# Patient Record
Sex: MALE | Race: WHITE | NOT HISPANIC OR LATINO | Employment: OTHER
[De-identification: names, ages, dates, MRNs, and addresses within clinical notes are randomized per-mention and may not be internally consistent; named-entity substitution may affect disease eponyms.]

---

## 2023-10-27 ENCOUNTER — TRANSCRIBE ORDERS (OUTPATIENT)
Dept: OTHER | Age: 78
End: 2023-10-27

## 2023-10-27 ENCOUNTER — PRE VISIT (OUTPATIENT)
Dept: ONCOLOGY | Facility: CLINIC | Age: 78
End: 2023-10-27

## 2023-10-27 ENCOUNTER — PATIENT OUTREACH (OUTPATIENT)
Dept: ONCOLOGY | Facility: CLINIC | Age: 78
End: 2023-10-27

## 2023-10-27 DIAGNOSIS — D49.6 BRAIN TUMOR (H): Primary | ICD-10-CM

## 2023-10-27 NOTE — TELEPHONE ENCOUNTER
RECORDS STATUS - ALL OTHER DIAGNOSIS      RECORDS RECEIVED FROM: [facility], [facility] (in [place]), [facility]    Appt Date: TBD NN WQ    Brain Tumor - Pt's Ph #: [phone]   Action    Action Taken 10/27/2023 2:43pm KATHERINE   I'll wait for RN Lary to complete her notes in EPIC.     10/30/2023 8:48AM KATHERINE   I called [facility] Help Desk Phone: [phone] - their office is closed right now.     9:45AM KATHERINE   I called pt Jac - his primary phone number is not in service.     I sent an ib-message to scheduling to see if they have a good phone # for the patient.     10/30/2023 3:29pm KATHERINE  I called [facility] in [place] again  [phone] #2. I was on hold for a few mins. [place] ID #: [id number]- I was able to successfully pull recs into the pt's [place] chart!     I called [facility]'s IMG dept- I was on hold for 10mins... I called back again... #2 #1- they don't print scans on a DICOM disc or email scans. They only push scans through [employer]. Once I fax over a formal request, they will email me a link to the [facility] website.     I faxed over a STAT request for scans.     10/30/2023 4pm KATHERINE   I called pt Jac again- he said his records at [facility] are 17-20 years ago- they are unrelated records. All of his related outside records are at [facility].     10/31/2023 9:27AM KATHERINE   I faxed another request to [facility] stating that we are okay receiving scans via [employer]- they will provide a link. I gave them my work email address: [email or web address]    11/1/2023 10:42AM KATHERINE   [facility] sent me [employer] e-mail links around 7:30pm last night. I'm not sure what the password is yet. I reached out to their email contact. I passed the emails along to the CSS team. Johnny Nathan has now been scheduled with Dr. Cannon (May 2024).       NOTES STATUS DETAILS   OFFICE NOTE from referring provider  Ref by:  self-referred    OFFICE NOTE from medical oncologist Complete [facility]- [facility] Records    DISCHARGE SUMMARY from hospital     DISCHARGE REPORT from the ER     OPERATIVE REPORT     CLINICAL TRIAL TREATMENTS TO DATE     LABS     PATHOLOGY REPORTS     ANYTHING RELATED TO DIAGNOSIS Complete Labs last updated on 3/17/2023 in [place]   GENONOMIC TESTING     TYPE:     IMAGING (NEED IMAGES & REPORT)     Xray Chest Requested- [facility] 10/4/2023, 1/5/2023   MRI Requested- [facility]     Ph: [phone]  Fax: [phone] MRI Brain 8/2/2023, 11/2/2022, 5/2/2022, 1/3/2022, 9/29/2021, 6/23/2021, 5/11/2021, 4/7/2021    MRA Head/Brain 5/11/2021    MRI Lumbar 8/2/2023    MAMMO     ULTRASOUND     PET

## 2023-10-27 NOTE — PROGRESS NOTES
"New Patient Hematology / Oncology Nurse Navigator Note     Referral Date: 10/27/23    Referring provider: Self-referred transfer of care (per email from Dr. Cueto to Dr. Cannon)    Referring Clinic/Organization: Self Referred     Referred to: Neuro Oncology    Requested provider (if applicable): Dr. Cannon    Evaluation for : H/o brain tumor (repeat imaging due 5/2024)      Clinical History (per Nurse review of records provided):     8/2/23 Office Visit with Neuro Oncology at [facility]:   \"Assessment & Plan:  Mr. Owens is a 77 year old right handed  with new onset diplopia on 4/4/21 with MRI brain showing an abnormal FLAIR/T2 signal in cerebellar vermis with a small enhancing lesion in the left midbrain. Staging workup shows negative CT chest abdomen pelvis and MRI spine shows a small enhancing lesion on left outgoing sacral nerve root. Diplopia improving without steroids or interventions.  Brain lesion with diplopia. MRI brain from 8/2/23 was overall stable compared to brain MRI from 11/2/22. FLAIR/T2 hyperintense lesion in the vermis now measures 6.5 mm, previously 5 mm in Sept 2021. Stable, per RANO criteria. No new contrast enhancement noted   We reviewed differential diagnoses including a possible low grade tumor in the vermis such as ependymoma or glioneuronal tumor, glioma and we may be seeing idiopathic cranial neuropathy as his diplopia has improved significantly with no steroids or interventions in the setting of an underlying lower grade malignancy. Other etiologies could include possible post surgical stroke, post inflammatory changes or inflammatory disorders such as neurosarcoidois, CNS lymphoma.   Thus far, he has no symptoms concerning for possible malignancy. He has had stable brain MRIs without mass effect or other characteristics noted that would predict aggressive behavior to cerebellar lesion. We can now increase MRI interval to q9 months.   We will see him back in 9 months with MRI " "of the brain.  Continue L-spine imaging yearly.  We would not recommend further workup at this time unless he has new or worsening symptoms. We also discussed potential LP for CSF studies including cytology and flow. Hold off for now \"-- BOOKMARKED  8/2/23 Brain MRI/ MRI Lumbar Spine-- BOOKMARKED      Clinical Assessment / Barriers to Care (Per Nurse):  Pt lives in [place]. 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     Records Location: Care Everywhere     Records Needed:   Records from:   [facility], [facility] (in [place]), [facility], [facility] per protocol (need to request brain path, unsure where brain surgery was done) please reach out to patient to obtain all records relating to his brain tumor.     Additional testing needed prior to consult:   Brain MRI due May 2024 (L-spine MRI due Aug 2024 ). Patient is not currently having any new neurological symptoms so would like to have imaging done in May and follow-up with Dr. Cannon at that time if she is agreeable with that plan.  He is agreeable to waiting on L-Spine imaging until August as his last scan was stable and recommendation is to repeat L-spine imaging in 1 year.     IB to Dr. Cannon to review and order imaging if appropriate.     Referral updates and Plan:   OUTGOING CALL to pt:  Introduced my role as nurse navigator with [facility] Hematology/Oncology dept and that we have recd the referral to Dr. Cannon (Neuro Oncology) for transfer of care from [place]. Provided my direct contact information if future questions arise.     Will await input from Dr. Cannon and arrange imaging/follow-up accordingly.     Addendum: Pt scheduled for brain MRI/Dr. Cannon May 2024, will wait until August for L-spine imaging    Lary Neri, BSN, RN, PHN, OCN  Hematology/Oncology Nurse Navigator  [facility]  [phone]    "

## 2023-10-30 DIAGNOSIS — R90.89 MRI OF BRAIN ABNORMAL: ICD-10-CM

## 2023-10-30 DIAGNOSIS — G93.9 BRAIN LESION: Primary | ICD-10-CM

## 2023-10-31 DIAGNOSIS — R93.7 ABNORMAL MRI, LUMBAR SPINE: Primary | ICD-10-CM

## 2023-11-06 ENCOUNTER — TELEPHONE (OUTPATIENT)
Dept: INTERNAL MEDICINE | Facility: CLINIC | Age: 78
End: 2023-11-06
Payer: MEDICARE

## 2023-11-06 NOTE — TELEPHONE ENCOUNTER
[facility] Call Center    Phone Message    May a detailed message be left on voicemail: yes     Reason for Call: Other: Patient wanted to schedule an appointment with Mary as a new patient and stated Mary Wright spoke to patients doctor Dr. Brayan Cueto and agreed to take on Jac as a new patient. Please verify and call patient to discuss.

## 2023-11-18 ENCOUNTER — HEALTH MAINTENANCE LETTER (OUTPATIENT)
Age: 78
End: 2023-11-18

## 2023-12-27 ENCOUNTER — LAB (OUTPATIENT)
Dept: LAB | Facility: CLINIC | Age: 78
End: 2023-12-27
Payer: MEDICARE

## 2023-12-27 ENCOUNTER — OFFICE VISIT (OUTPATIENT)
Dept: INTERNAL MEDICINE | Facility: CLINIC | Age: 78
End: 2023-12-27
Payer: MEDICARE

## 2023-12-27 VITALS
SYSTOLIC BLOOD PRESSURE: 113 MMHG | DIASTOLIC BLOOD PRESSURE: 79 MMHG | HEART RATE: 71 BPM | WEIGHT: 154.1 LBS | OXYGEN SATURATION: 95 % | BODY MASS INDEX: 22.82 KG/M2 | HEIGHT: 69 IN

## 2023-12-27 DIAGNOSIS — Z23 NEED FOR TDAP VACCINATION: ICD-10-CM

## 2023-12-27 DIAGNOSIS — Z00.00 ROUTINE GENERAL MEDICAL EXAMINATION AT A HEALTH CARE FACILITY: ICD-10-CM

## 2023-12-27 DIAGNOSIS — Z12.5 ENCOUNTER FOR SCREENING FOR MALIGNANT NEOPLASM OF PROSTATE: ICD-10-CM

## 2023-12-27 DIAGNOSIS — Z29.11 NEED FOR VACCINATION AGAINST RESPIRATORY SYNCYTIAL VIRUS: ICD-10-CM

## 2023-12-27 DIAGNOSIS — E11.9 TYPE 2 DIABETES MELLITUS WITHOUT COMPLICATION, WITHOUT LONG-TERM CURRENT USE OF INSULIN (H): ICD-10-CM

## 2023-12-27 DIAGNOSIS — R05.9 COUGH, UNSPECIFIED TYPE: ICD-10-CM

## 2023-12-27 DIAGNOSIS — N40.0 BENIGN PROSTATIC HYPERPLASIA WITHOUT LOWER URINARY TRACT SYMPTOMS: ICD-10-CM

## 2023-12-27 DIAGNOSIS — C44.321 SQUAMOUS CELL CANCER OF SKIN OF ALA NASI: ICD-10-CM

## 2023-12-27 DIAGNOSIS — M10.9 GOUT, UNSPECIFIED CAUSE, UNSPECIFIED CHRONICITY, UNSPECIFIED SITE: ICD-10-CM

## 2023-12-27 DIAGNOSIS — Z11.59 NEED FOR HEPATITIS C SCREENING TEST: Primary | ICD-10-CM

## 2023-12-27 DIAGNOSIS — E78.00 HYPERCHOLESTEREMIA: ICD-10-CM

## 2023-12-27 DIAGNOSIS — I10 BENIGN ESSENTIAL HYPERTENSION: ICD-10-CM

## 2023-12-27 LAB
BASOPHILS # BLD AUTO: 0.1 10E3/UL (ref 0–0.2)
BASOPHILS NFR BLD AUTO: 1 %
CHOLEST SERPL-MCNC: 142 MG/DL
EOSINOPHIL # BLD AUTO: 0.4 10E3/UL (ref 0–0.7)
EOSINOPHIL NFR BLD AUTO: 3 %
ERYTHROCYTE [DISTWIDTH] IN BLOOD BY AUTOMATED COUNT: 14.6 % (ref 10–15)
FASTING STATUS PATIENT QL REPORTED: YES
HBA1C MFR BLD: 6.2 %
HCT VFR BLD AUTO: 51.1 % (ref 40–53)
HDLC SERPL-MCNC: 42 MG/DL
HGB BLD-MCNC: 16.9 G/DL (ref 13.3–17.7)
IMM GRANULOCYTES # BLD: 0.2 10E3/UL
IMM GRANULOCYTES NFR BLD: 2 %
LDLC SERPL CALC-MCNC: 62 MG/DL
LYMPHOCYTES # BLD AUTO: 3.5 10E3/UL (ref 0.8–5.3)
LYMPHOCYTES NFR BLD AUTO: 30 %
MCH RBC QN AUTO: 32.3 PG (ref 26.5–33)
MCHC RBC AUTO-ENTMCNC: 33.1 G/DL (ref 31.5–36.5)
MCV RBC AUTO: 98 FL (ref 78–100)
MONOCYTES # BLD AUTO: 1 10E3/UL (ref 0–1.3)
MONOCYTES NFR BLD AUTO: 9 %
NEUTROPHILS # BLD AUTO: 6.2 10E3/UL (ref 1.6–8.3)
NEUTROPHILS NFR BLD AUTO: 55 %
NONHDLC SERPL-MCNC: 100 MG/DL
NRBC # BLD AUTO: 0 10E3/UL
NRBC BLD AUTO-RTO: 0 /100
PLATELET # BLD AUTO: 259 10E3/UL (ref 150–450)
PSA SERPL DL<=0.01 NG/ML-MCNC: 12.72 NG/ML (ref 0–6.5)
RBC # BLD AUTO: 5.23 10E6/UL (ref 4.4–5.9)
TRIGL SERPL-MCNC: 188 MG/DL
TSH SERPL DL<=0.005 MIU/L-ACNC: 1.93 UIU/ML (ref 0.3–4.2)
URATE SERPL-MCNC: 3.5 MG/DL (ref 3.4–7)
WBC # BLD AUTO: 11.1 10E3/UL (ref 4–11)

## 2023-12-27 PROCEDURE — 36415 COLL VENOUS BLD VENIPUNCTURE: CPT | Performed by: PATHOLOGY

## 2023-12-27 PROCEDURE — 99204 OFFICE O/P NEW MOD 45 MIN: CPT | Performed by: INTERNAL MEDICINE

## 2023-12-27 PROCEDURE — 84443 ASSAY THYROID STIM HORMONE: CPT | Performed by: PATHOLOGY

## 2023-12-27 PROCEDURE — 85025 COMPLETE CBC W/AUTO DIFF WBC: CPT | Performed by: PATHOLOGY

## 2023-12-27 PROCEDURE — G0103 PSA SCREENING: HCPCS | Performed by: PATHOLOGY

## 2023-12-27 PROCEDURE — 80061 LIPID PANEL: CPT | Performed by: PATHOLOGY

## 2023-12-27 PROCEDURE — 99000 SPECIMEN HANDLING OFFICE-LAB: CPT | Performed by: PATHOLOGY

## 2023-12-27 PROCEDURE — 84550 ASSAY OF BLOOD/URIC ACID: CPT | Performed by: PATHOLOGY

## 2023-12-27 PROCEDURE — 83036 HEMOGLOBIN GLYCOSYLATED A1C: CPT | Performed by: INTERNAL MEDICINE

## 2023-12-27 RX ORDER — CALCIUM CARBONATE 500 MG/1
1 TABLET, CHEWABLE ORAL PRN
COMMUNITY

## 2023-12-27 RX ORDER — ALLOPURINOL 300 MG/1
1 TABLET ORAL DAILY
COMMUNITY
Start: 2023-07-17 | End: 2024-06-28

## 2023-12-27 RX ORDER — SIMVASTATIN 40 MG
40 TABLET ORAL DAILY
COMMUNITY
Start: 2023-07-17 | End: 2024-09-24

## 2023-12-27 RX ORDER — FLUTICASONE PROPIONATE 50 MCG
2 SPRAY, SUSPENSION (ML) NASAL DAILY
COMMUNITY
Start: 2023-07-17

## 2023-12-27 RX ORDER — TRIAMCINOLONE ACETONIDE 1 MG/G
OINTMENT TOPICAL PRN
COMMUNITY

## 2023-12-27 RX ORDER — FAMOTIDINE 40 MG/1
40 TABLET, FILM COATED ORAL 2 TIMES DAILY
COMMUNITY
End: 2024-06-28

## 2023-12-27 RX ORDER — LORATADINE 10 MG/1
10 TABLET ORAL DAILY
COMMUNITY

## 2023-12-27 RX ORDER — PANTOPRAZOLE SODIUM 40 MG/1
40 TABLET, DELAYED RELEASE ORAL DAILY
COMMUNITY
End: 2024-07-01

## 2023-12-27 RX ORDER — LISINOPRIL 20 MG/1
20 TABLET ORAL DAILY
COMMUNITY
Start: 2023-07-17 | End: 2024-06-27

## 2023-12-27 RX ORDER — RESPIRATORY SYNCYTIAL VIRUS VACCINE 120MCG/0.5
0.5 KIT INTRAMUSCULAR ONCE
Qty: 1 EACH | Refills: 0 | Status: CANCELLED | OUTPATIENT
Start: 2023-12-27 | End: 2023-12-27

## 2023-12-27 RX ORDER — IPRATROPIUM BROMIDE AND ALBUTEROL 20; 100 UG/1; UG/1
1 SPRAY, METERED RESPIRATORY (INHALATION) PRN
COMMUNITY

## 2023-12-27 RX ORDER — CYCLOBENZAPRINE HCL 5 MG
5 TABLET ORAL PRN
COMMUNITY

## 2023-12-27 RX ORDER — METFORMIN HCL 500 MG
1 TABLET, EXTENDED RELEASE 24 HR ORAL DAILY
COMMUNITY
Start: 2023-07-17

## 2023-12-27 NOTE — PROGRESS NOTES
"Jac Owens is a pleasant 78 year old year old male coming in today to establish care.     The patient's wife established care last week, Jac is here to establish and consolidate care today as well.     He had a slight cough last month. He reports every with URI he experience a bronchitis type exacerbation and dyspnea with exertion. Jac states he has returned to baseline and in general responds well to antibiotics and steroids.    He had a borderline PSA (although it is trending down over the last two years) and has a history of BPH. Urology referral.     Davide has a PMH of SCC with successful removal. Dermatology referral.    Patient reports occasional reflux and rare dysphasia. He states he is generally asymptomatic, last surgery was 3 years ago, Nissen. He had a coloscopy and ultrasound with benign polyps seen in both his stomach and colon. GI appointment is pending.     He states the majority of the time is spent taking care of his wife who was diagnosed with endometrial cancer. He experienced stress with the move back to [place] and believes his recent shingles infection was due to the associated stress. Jac usually walks on his treadmill and participates in weight resistance training. He and his wife are enjoying reconnecting with decades old friends since moving back.     His hypertension and diabetes appear well controlled with current medication.     Davide reports a history of slight MCV elevation. Continue regular monitoring.     Fasting routine labs ordered today. Patient will receive the most up to date COVID-19 and influenza series, as well as all other necessary vaccinations at their local pharmacy. They have a routine eye care appointment pending. They are up to date on all other health care maintenance.    /79 (BP Location: Right arm, Patient Position: Sitting, Cuff Size: Adult Regular)   Pulse 71   Ht 1.753 m (5' 9\")   Wt 69.9 kg (154 lb 1.6 oz)   SpO2 95%   BMI 22.76 " kg/m      PHYSICAL EXAM    Constitutional: no distress, comfortable, pleasant   Cardiovascular: regular rate and rhythm, normal S1 and S2, no murmurs, rubs or gallops, peripheral pulses full and symmetric   Respiratory: clear to auscultation, no wheezes or crackles, normal breath sounds  Skin: no concerning lesions, no jaundice   Neurological:  normal gait, no tremor   Psychological: appropriate mood   Lymphatic: no cervical lymphadenopathy and no pedal edema      ASSESSMENT & PLAN    Jac was seen today for establish care.    Benign essential hypertension: well controlled, continue current medication    Type 2 diabetes mellitus without complication, without long-term current use of insulin (H)  -     Hemoglobin A1c; Future  -     TSH with free T4 reflex; Future    Hypercholesteremia  -     Lipid Profile FASTING; Future    Benign prostatic hyperplasia without lower urinary tract symptoms  -     PSA, screen; Future  -     Adult Urology  Referral; Future    Cough, unspecified type  -     CBC with platelets and differential; Future    Routine general medical examination at a health care facility  -     REVIEW OF HEALTH MAINTENANCE PROTOCOL ORDERS    Gout, unspecified cause, unspecified chronicity, unspecified site  -     Uric acid; Future    Encounter for screening for malignant neoplasm of prostate  -     PSA, screen; Future    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 Mary Wright- -based on data collection and the provider's statements to me.     Provider Disclosure:  I agree with above History, Review of Systems, Physical exam and Plan.  I have reviewed the content of the documentation and have edited it as needed. I have personally performed the services documented here and the documentation  accurately represents those services and the decisions I have made.      Electronically signed by:  Dr. Mary Wright

## 2023-12-27 NOTE — PROGRESS NOTES
Jac is a 78 year old that presents in clinic today for the following:     Chief Complaint   Patient presents with    Establish Care     See list           12/27/2023    11:04 AM   Additional Questions   Roomed by [facility] EMT       Screenings as of today     PHQ-2 Total Score (Adult) - Positive if 3 or more points; Administer   PHQ-9 if positive 0        Sharona Mejia MA at 11:19 AM on 12/27/2023

## 2024-01-18 NOTE — TELEPHONE ENCOUNTER
Action 2024 JTV 9:20 PM    Action Taken CSS sent an urgent request to [facility].     Trackin    [facility], fax: [phone]     Action 2024 JTV 4:12 PM    Action Taken CSS had Glenda resolve images in [facility].    MEDICAL RECORDS REQUEST   [facility]  Urology Clinic  [address] [place] [address]  PHONE: [phone]  Fax: [phone]        FUTURE VISIT INFORMATION                                                   Jac ALBERT Roman, : 1945 scheduled for future visit at [facility]    APPOINTMENT INFORMATION:  Date: 2024  Provider:  Boubacar Lenz PA-C  Reason for Visit/Diagnosis: Benign prostatic hyperplasia without lower urinary tract symptoms    REFERRAL INFORMATION:  Referring provider:  Mary Kwan MD in [facility]      RECORDS REQUESTED FOR VISIT                                                     NOTES  STATUS/DETAILS   OFFICE NOTE from referring provider  yes, 2023 -- Mary Kwan MD in [facility]   OFFICE NOTE from other specialist  yes   MEDICATION LIST  yes   LABS     URINALYSIS (UA)  yes   BENIGN PROSTATIC HYPERPLASIA (BPH)     IMAGES  YES, 2022 -- CT ABD PELVIS   PSA  yes     PRE-VISIT CHECKLIST      Joint diagnostic appointment coordinated correctly          (ensure right order & amount of time) Yes   RECORD COLLECTION COMPLETE yes

## 2024-02-16 NOTE — PROGRESS NOTES
Subjective     REQUESTING PROVIDER  Mary Wright    REASON FOR VISIT  Urologic establishing visit    HISTORY OF PRESENT ILLNESS  Mr. Owens is a pleasant 78 year old male with a past medical history significant for insomnia, hypertension, type 2 diabetes, hypercholesterolemia, and gout who presents today to establish with a local urology department for his issues with BPH and elevated PSA.  He has previously followed for these with his urologist in [place], but recently moved to [place].  I personally reviewed his most recent family practice visit note from 12/27/2023 as well as the outside urology note from 3/6/2023 in preparation for today's visit.    At that urologic visit, he was presenting due to elevated PSA of 13.  It was reviewed that he has had a prostate biopsy all the way back in 2014 that was negative, and his prostate was sized to be about 41 g at that time.  From a urinary standpoint, he endorsed some slight increase in his urinary urgency as well as the need to double void, but felt his stream was strong.  Denied any hematuria, dysuria, fevers, chills, or abdominal discomfort or pressure.  Also endorsed a family history of prostate cancer as well as breast cancer.  IPSS was noted to be 13 placing him in the moderate category, but his quality-of-life score was 1 stating he would be fine to live this way for the rest of his life.    Recently moved here to [place] and was found to have an elevated PSA again of 12.72 so was referred to urology.    Today:  No pelvic pain  No gross hematuria or hematospermia   No change to urinary symptoms recently  No baseline bothersome urinary symptoms   No history of UTIs     Social History:  Denies any history of or current smoking     Family History:  Denies any known family history of urologic malignancy     Objective     PHYSICAL EXAM  General: Alert, oriented, no acute distress    LABORATORY  Lab Results   Component Value Date/Time    PSA  12.72 (H) 12/27/2023 11:51 AM     Outside PSA results:    5/8/23: 8.48  3/8/2023: 9.24  1/17/23: 13.72 (17% free)  PSA 9.461 (High) 08/03/2022   PSA 11.566 (High) 12/08/2021   PSA 6.053 (High) 04/26/2021 7/13/20; 6.02  10/10/19; 6.26  9/7/19; 5.91  4/17/19; 6.63  4/16/18; 4.74  3/1/17: 6.42  1/20/16: 6.63  7/18/14: 7.12  10/29/13: 9.30     IMAGING  No history of prostate MRI    Assessment & Plan   Elevated PSA  BPH with no bothersome symptoms     It was my pleasure meeting Mr. Owens in clinic today for discussion of his long standing history of elevated PSA. We first reviewed the etiologies of elevated PSA, including infection, inflammation, ejaculation prior to sampling, BPH, recent perineal trauma or catheterization, versus malignancy. We then discussed the natural history of prostate cancer and how it shapes our prostate cancer screening. Finally, we reviewed Mr. Owens current and previous PSA's and discussed that we only ever had 1 better piece of information from a PSA in the form of the biopsy that was done a decade ago.  In addition, had he seen us in his early to mid 70s, we likely would have recommended discontinuation of prostate cancer screening given his negative biopsy and reassuring PSAs.  However, given that we have now seen an increase in his baseline, this could represent the development of a prostate cancer versus just continued inflammation. In light of this, we discussed his options which include a prostate MRI first available to get better with some information versus discontinuation of prostate cancer screening.    After a detailed discussion during which we reviewed the risks and benefits of the above options, Mr. Owens elects to proceed with a first available prostate MRI. Mr. Owens expressed understanding and agreement to the above discussion and plan and all of his questions were answered to his satisfaction. [facility] and recommended next  steps.    PLAN  First available prostate MRI with [employer] message to discuss results and next steps    Signed by:    Boubacar Lenz PA-C    I spent a total of 21 minutes spent on the date of the encounter doing chart review, history and exam, documentation, and further activities as noted above.

## 2024-02-19 ENCOUNTER — TRANSFERRED RECORDS (OUTPATIENT)
Dept: HEALTH INFORMATION MANAGEMENT | Facility: CLINIC | Age: 79
End: 2024-02-19
Payer: MEDICARE

## 2024-02-21 ENCOUNTER — OFFICE VISIT (OUTPATIENT)
Dept: UROLOGY | Facility: CLINIC | Age: 79
End: 2024-02-21
Attending: STUDENT IN AN ORGANIZED HEALTH CARE EDUCATION/TRAINING PROGRAM
Payer: MEDICARE

## 2024-02-21 ENCOUNTER — PRE VISIT (OUTPATIENT)
Dept: UROLOGY | Facility: CLINIC | Age: 79
End: 2024-02-21

## 2024-02-21 ENCOUNTER — TELEPHONE (OUTPATIENT)
Dept: UROLOGY | Facility: CLINIC | Age: 79
End: 2024-02-21

## 2024-02-21 VITALS
BODY MASS INDEX: 22.22 KG/M2 | HEART RATE: 77 BPM | DIASTOLIC BLOOD PRESSURE: 78 MMHG | HEIGHT: 69 IN | SYSTOLIC BLOOD PRESSURE: 116 MMHG | WEIGHT: 150 LBS

## 2024-02-21 DIAGNOSIS — N40.0 BENIGN PROSTATIC HYPERPLASIA WITHOUT LOWER URINARY TRACT SYMPTOMS: ICD-10-CM

## 2024-02-21 DIAGNOSIS — R97.20 ELEVATED PSA: Primary | ICD-10-CM

## 2024-02-21 PROCEDURE — 99204 OFFICE O/P NEW MOD 45 MIN: CPT | Performed by: STUDENT IN AN ORGANIZED HEALTH CARE EDUCATION/TRAINING PROGRAM

## 2024-02-21 ASSESSMENT — PAIN SCALES - GENERAL: PAINLEVEL: NO PAIN (0)

## 2024-02-21 NOTE — NURSING NOTE
"Chief Complaint   Patient presents with    Consult For     Benign prostatic hyperplasia without lower urinary tract symptoms       Blood pressure 116/78, pulse 77, height 1.753 m (5' 9\"), weight 68 kg (150 lb). Body mass index is 22.15 kg/m .    There is no problem list on file for this patient.      Allergies   Allergen Reactions    Bee Venom Unknown    Cats     Seasonal Allergies        Current Outpatient Medications   Medication Sig Dispense Refill    allopurinol (ZYLOPRIM) 300 MG tablet Take 1 tablet by mouth daily      calcium carbonate (TUMS) 500 MG chewable tablet Take 1 chew tab by mouth as needed for heartburn      cyclobenzaprine (FLEXERIL) 5 MG tablet Take 5 mg by mouth as needed for muscle spasms      doxylamine (UNISOM) 12.5 mg TABS half-tab Take 12.5 mg by mouth at bedtime      famotidine (PEPCID) 40 MG tablet Take 40 mg by mouth 2 times daily      fluticasone (FLONASE) 50 MCG/ACT nasal spray 2 sprays daily      ipratropium-albuterol (COMBIVENT RESPIMAT)  MCG/ACT inhaler Inhale 1 puff into the lungs as needed for shortness of breath, wheezing or cough      lisinopril (ZESTRIL) 20 MG tablet Take 20 mg by mouth daily      loratadine (CLARITIN) 10 MG tablet Take 10 mg by mouth daily      metFORMIN (GLUCOPHAGE XR) 500 MG 24 hr tablet Take 1 tablet by mouth daily      pantoprazole (PROTONIX) 40 MG EC tablet Take 40 mg by mouth daily Takes 2 per day      simvastatin (ZOCOR) 40 MG tablet Take 40 mg by mouth daily      triamcinolone (KENALOG) 0.1 % external ointment Apply topically as needed for irritation         Social History     Tobacco Use    Smoking status: Never    Smokeless tobacco: Never   Substance Use Topics    Alcohol use: Never    Drug use: Never       Nathanael Tilley MA  2/21/2024  8:54 AM     "

## 2024-02-21 NOTE — LETTER
2/21/2024       RE: Jac Owens  [address] [address] [place] [address]     Dear Colleague,    Thank you for referring your patient, Jac Owens, to the [facility] [place] at [facility]. Please see a copy of my visit note below.    Subjective    REQUESTING PROVIDER  Mary Wright    REASON FOR VISIT  Urologic establishing visit    HISTORY OF PRESENT ILLNESS  Mr. Owens is a pleasant 78 year old male with a past medical history significant for insomnia, hypertension, type 2 diabetes, hypercholesterolemia, and gout who presents today to establish with a local urology department for his issues with BPH and elevated PSA.  He has previously followed for these with his urologist in [place], but recently moved to [place].  I personally reviewed his most recent family practice visit note from 12/27/2023 as well as the outside urology note from 3/6/2023 in preparation for today's visit.    At that urologic visit, he was presenting due to elevated PSA of 13.  It was reviewed that he has had a prostate biopsy all the way back in 2014 that was negative, and his prostate was sized to be about 41 g at that time.  From a urinary standpoint, he endorsed some slight increase in his urinary urgency as well as the need to double void, but felt his stream was strong.  Denied any hematuria, dysuria, fevers, chills, or abdominal discomfort or pressure.  Also endorsed a family history of prostate cancer as well as breast cancer.  IPSS was noted to be 13 placing him in the moderate category, but his quality-of-life score was 1 stating he would be fine to live this way for the rest of his life.    Recently moved here to [place] and was found to have an elevated PSA again of 12.72 so was referred to urology.    Today:  No pelvic pain  No gross hematuria or hematospermia   No change to urinary symptoms recently  No baseline  bothersome urinary symptoms   No history of UTIs     Social History:  Denies any history of or current smoking     Family History:  Denies any known family history of urologic malignancy     Objective    PHYSICAL EXAM  General: Alert, oriented, no acute distress    LABORATORY  Lab Results   Component Value Date/Time    PSA 12.72 (H) 12/27/2023 11:51 AM     Outside PSA results:    5/8/23: 8.48  3/8/2023: 9.24  1/17/23: 13.72 (17% free)  PSA 9.461 (High) 08/03/2022   PSA 11.566 (High) 12/08/2021   PSA 6.053 (High) 04/26/2021 7/13/20; 6.02  10/10/19; 6.26  9/7/19; 5.91  4/17/19; 6.63  4/16/18; 4.74  3/1/17: 6.42  1/20/16: 6.63  7/18/14: 7.12  10/29/13: 9.30     IMAGING  No history of prostate MRI    Assessment & Plan  Elevated PSA  BPH with no bothersome symptoms     It was my pleasure meeting Mr. Owens in clinic today for discussion of his long standing history of elevated PSA. We first reviewed the etiologies of elevated PSA, including infection, inflammation, ejaculation prior to sampling, BPH, recent perineal trauma or catheterization, versus malignancy. We then discussed the natural history of prostate cancer and how it shapes our prostate cancer screening. Finally, we reviewed Mr. Owens current and previous PSA's and discussed that we only ever had 1 better piece of information from a PSA in the form of the biopsy that was done a decade ago.  In addition, had he seen us in his early to mid 70s, we likely would have recommended discontinuation of prostate cancer screening given his negative biopsy and reassuring PSAs.  However, given that we have now seen an increase in his baseline, this could represent the development of a prostate cancer versus just continued inflammation. In light of this, we discussed his options which include a prostate MRI first available to get better with some information versus discontinuation of prostate cancer screening.    After a detailed discussion during which we reviewed  the risks and benefits of the above options, Mr. Owens elects to proceed with a first available prostate MRI. Mr. Owens expressed understanding and agreement to the above discussion and plan and all of his questions were answered to his satisfaction. I will reach out over [employer] with the results and recommended next steps.    PLAN  First available prostate MRI with [employer] message to discuss results and next steps    Signed by:    Boubacar Lenz PA-C    I spent a total of 21 minutes spent on the date of the encounter doing chart review, history and exam, documentation, and further activities as noted above.

## 2024-02-29 ENCOUNTER — OFFICE VISIT (OUTPATIENT)
Dept: PODIATRY | Facility: CLINIC | Age: 79
End: 2024-02-29
Attending: INTERNAL MEDICINE
Payer: MEDICARE

## 2024-02-29 ENCOUNTER — OFFICE VISIT (OUTPATIENT)
Dept: INTERNAL MEDICINE | Facility: CLINIC | Age: 79
End: 2024-02-29
Payer: MEDICARE

## 2024-02-29 VITALS
WEIGHT: 158.3 LBS | HEIGHT: 69 IN | OXYGEN SATURATION: 97 % | DIASTOLIC BLOOD PRESSURE: 77 MMHG | SYSTOLIC BLOOD PRESSURE: 136 MMHG | BODY MASS INDEX: 23.44 KG/M2 | HEART RATE: 80 BPM

## 2024-02-29 VITALS — SYSTOLIC BLOOD PRESSURE: 118 MMHG | BODY MASS INDEX: 23.33 KG/M2 | WEIGHT: 158 LBS | DIASTOLIC BLOOD PRESSURE: 78 MMHG

## 2024-02-29 DIAGNOSIS — R05.3 CHRONIC COUGH: Primary | ICD-10-CM

## 2024-02-29 DIAGNOSIS — L85.3 XEROSIS OF SKIN: ICD-10-CM

## 2024-02-29 DIAGNOSIS — L84 PRE-ULCERATIVE CALLUSES: ICD-10-CM

## 2024-02-29 DIAGNOSIS — M79.671 RIGHT FOOT PAIN: Primary | ICD-10-CM

## 2024-02-29 DIAGNOSIS — E11.9 TYPE 2 DIABETES MELLITUS WITHOUT COMPLICATION, WITHOUT LONG-TERM CURRENT USE OF INSULIN (H): ICD-10-CM

## 2024-02-29 PROCEDURE — 99203 OFFICE O/P NEW LOW 30 MIN: CPT | Performed by: PODIATRIST

## 2024-02-29 PROCEDURE — 99213 OFFICE O/P EST LOW 20 MIN: CPT

## 2024-02-29 NOTE — LETTER
2/29/2024         RE: Jac Owens  [address] [facility] [address] [place] [id number]        Dear Colleague,    Thank you for referring your patient, Jac Owens, to the [facility] POD[facility]. Please see a copy of my visit note below.    PATIENT HISTORY:  Dr. Italo Wright requested I see this patient for their foot issue.  Jac Owens is a 78 year old male who presents to clinic for left foot corn.  Has been there for 2 weeks.  Notes it is an aching pain.  Pain can be 7 out of 10 when walking.  He has tried scraping it which may help a little bit.  Denies specific injury.  Wondering what can be done to get rid of it.    Review of Systems:  Patient denies fever, chills, rash, wound, stiffness,  numbness, weakness, heart burn, blood in stool, chest pain with activity, calf pain when walking, shortness of breath with activity, chronic cough, easy bleeding/bruising, swelling of ankles, excessive thirst, fatigue, depression, anxiety.  Patient admits to limping at times. .     PAST MEDICAL HISTORY:   Past Medical History:   Diagnosis Date     Cancer (H)      COPD (chronic obstructive pulmonary disease) (H)      Hypertension      Uncomplicated asthma         PAST SURGICAL HISTORY:   Past Surgical History:   Procedure Laterality Date     ABDOMEN SURGERY      Paraesophageal hernia, JIST Tumor        MEDICATIONS:   Current Outpatient Medications:      allopurinol (ZYLOPRIM) 300 MG tablet, Take 1 tablet by mouth daily, Disp: , Rfl:      calcium carbonate (TUMS) 500 MG chewable tablet, Take 1 chew tab by mouth as needed for heartburn, Disp: , Rfl:      cyclobenzaprine (FLEXERIL) 5 MG tablet, Take 5 mg by mouth as needed for muscle spasms, Disp: , Rfl:      doxylamine (UNISOM) 12.5 mg TABS half-tab, Take 12.5 mg by mouth at bedtime, Disp: , Rfl:      famotidine (PEPCID) 40 MG tablet, Take 40 mg by mouth 2 times daily, Disp: , Rfl:      fluticasone (FLONASE) 50 MCG/ACT nasal spray, 2  sprays daily, Disp: , Rfl:      ipratropium-albuterol (COMBIVENT RESPIMAT)  MCG/ACT inhaler, Inhale 1 puff into the lungs as needed for shortness of breath, wheezing or cough, Disp: , Rfl:      lisinopril (ZESTRIL) 20 MG tablet, Take 20 mg by mouth daily, Disp: , Rfl:      loratadine (CLARITIN) 10 MG tablet, Take 10 mg by mouth daily, Disp: , Rfl:      metFORMIN (GLUCOPHAGE XR) 500 MG 24 hr tablet, Take 1 tablet by mouth daily, Disp: , Rfl:      pantoprazole (PROTONIX) 40 MG EC tablet, Take 40 mg by mouth daily Takes 2 per day, Disp: , Rfl:      simvastatin (ZOCOR) 40 MG tablet, Take 40 mg by mouth daily, Disp: , Rfl:      triamcinolone (KENALOG) 0.1 % external ointment, Apply topically as needed for irritation, Disp: , Rfl:      ALLERGIES:    Allergies   Allergen Reactions     Bee Venom Unknown     Cats      Seasonal Allergies         SOCIAL HISTORY:   Social History     Socioeconomic History     Marital status:      Spouse name: Not on file     Number of children: Not on file     Years of education: Not on file     Highest education level: Not on file   Occupational History     Not on file   Tobacco Use     Smoking status: Never     Smokeless tobacco: Never   Substance and Sexual Activity     Alcohol use: Never     Drug use: Never     Sexual activity: Not Currently     Partners: Female   Other Topics Concern     Parent/sibling w/ CABG, MI or angioplasty before 65F 55M? No   Social History Narrative     Not on file     Social Determinants of Health     Financial Resource Strain: Low Risk  (12/20/2023)    Financial Resource Strain      Within the past 12 months, have you or your family members you live with been unable to get utilities (heat, electricity) when it was really needed?: No   Food Insecurity: Low Risk  (12/20/2023)    Food Insecurity      Within the past 12 months, did you worry that your food would run out before you got money to buy more?: No      Within the past 12 months, did the food  you bought just not last and you didn t have money to get more?: No   Transportation Needs: Low Risk  (12/20/2023)    Transportation Needs      Within the past 12 months, has lack of transportation kept you from medical appointments, getting your medicines, non-medical meetings or appointments, work, or from getting things that you need?: No   Physical Activity: Not on file   Stress: Not on file   Social Connections: Not on file   Interpersonal Safety: Low Risk  (12/27/2023)    Interpersonal Safety      Do you feel physically and emotionally safe where you currently live?: Yes      Within the past 12 months, have you been hit, slapped, kicked or otherwise physically hurt by someone?: No      Within the past 12 months, have you been humiliated or emotionally abused in other ways by your partner or ex-partner?: No   Housing Stability: Low Risk  (12/20/2023)    Housing Stability      Do you have housing? : Yes      Are you worried about losing your housing?: No        FAMILY HISTORY:   Family History   Problem Relation Age of Onset     Hypertension Father         EXAM:Vitals: /78   Wt 71.7 kg (158 lb)   BMI 23.33 kg/m    BMI= Body mass index is 23.33 kg/m .    A1C: 6.2 (12/27/2024)    General appearance: Patient is alert and fully cooperative with history & exam.  No sign of distress is noted during the visit.     Psychiatric: Affect is pleasant & appropriate.  Patient appears motivated to improve health.     Respiratory: Breathing is regular & unlabored while sitting.     HEENT: Hearing is intact to spoken word.  Speech is clear.  No gross evidence of visual impairment that would impact ambulation.     Dermatologic: Localized hyperkeratotic lesion to the lateral aspect of the right fourth PIPJ.  No open lesions or signs of acute infection noted.  Diffuse scaling to the skin on both feet.     Vascular: DP & PT pulses are intact & regular bilaterally.  No significant edema or varicosities noted.  CFT and skin  temperature is normal to both lower extremities.     Neurologic: Lower extremity sensation is intact to light touch.  No evidence of weakness or contracture in the lower extremities.  No evidence of neuropathy.     Musculoskeletal: Patient is ambulatory without assistive device or brace.  No gross ankle deformity noted.  No foot or ankle joint effusion is noted.     ASSESSMENT:    Right foot pain  Type 2 diabetes mellitus without complication, without long-term current use of insulin (H)  Pre-ulcerative calluses       Medical Decision Making/Plan:  Reviewed patient's chart in epic. Discussed causes of keratomas.  They are due to areas of increase friction.  Hammertoes can create these as they put more pressure to the metatarsal head.  Discussed treatments such as using foot file, pumice stone, metatarsal pads, orthotics, and not walking barefoot.     We discussed the cost structure of callus care if they were to come back and have it treated in the clinic if insurance does not cover it and explained that they would be billed. They were also provided information on places to get the callus treatment.    At this time recommend using a foot file or a pumice stone to the area to help keep the callus from building up.  Also recommended toe cover to help cushion the area.  Recommend using urea cream daily to the feet to help with dryness.    All questions were answered patient's satisfaction he will call further questions or concerns.    Patient risk factor: Patient is at low risk for infection. .        Carissa Sen DPM, Podiatry/Foot and Ankle Surgery      Again, thank you for allowing me to participate in the care of your patient.        Sincerely,        Carissa Sen DPM, Podiatry/Foot and Ankle Surgery

## 2024-02-29 NOTE — PROGRESS NOTES
[facility] [address]  4TH FLOOR  [facility] [address]  Phone: [phone]  Fax: [phone]    Patient:  Jac Owens, Date of birth 1945  Date of Visit:  02/29/2024  Referring Provider No att. providers found  Reason for visit: chronic cough      Assessment & Plan    Chronic cough  Chronic cough with recurrent episodes of bronchitis. He has not has PFTs completed recently. Will complete this to evaluate for possible obstructive disease. He notes he did not tolerate daily ICS previously, will await PFT results to determine potential therapy. Discussed continuing to limit triggers/exposures, including continued flonase and antihistamine use. Can trial a different antihistamine to see if there is any improvement. Continue good hand hygiene, wearing a mask to limit sick exposures. Based on PFT results, he may benefit from allergy/asthma or pulmonary referral.  - General PFT Lab (Please always keep checked)  - Pulmonary Function Test      Flaquita Lewis NP    History of Present Illness     Pertinent history obtain from: chart review and patient    Jac presents to the clinic today for evaluation of a chronic cough.    He notes that in early February he developed URI symptoms and a cough after spending time with his grandchildren. He was seen in urgent care on 2/9, was treated with prednisone and a zpak. Symptoms have improved, but he continues to have a nonproductive cough. He notes he will sometimes wake up with a dry cough. Tessalon has not been helpful in the past. Mucinex DM can help. He notes that he had a medrol dosepak, started this three days ago with benefit noticed.     He reports recurrent bronchitis frequently, at least 4 episodes a year requiring treatment, typically with antibiotics and prednisone. He wonders if he needs additional treatment to help prevent bronchitis. He uses a combivent inhaler which works well for him. He is  "not on a daily inhaler, stating he has tried ICS previously and would constantly get thrush despite rinsing and using a spacer. He had PFTs completed many years ago per his report, believes this just showed reactive airways, possibly from allergies. He has never smoked. He takes claritin daily, uses flonase daily. He uses triamcinolone infrequently for itchy patches on skin.    He uses lisinopril, notes he tolerates this well. He notes he will have periods without cough throughout the year, does not believe nonproductive cough is related to lisinopril use.       Physical Exam     Vital signs:  /77 (BP Location: Right arm, Patient Position: Sitting, Cuff Size: Adult Regular)   Pulse 80   Ht 1.753 m (5' 9\")   Wt 71.8 kg (158 lb 4.8 oz)   SpO2 97%   BMI 23.38 kg/m      GENERAL: alert and no distress  RESP: Lungs overall clear to auscultation, brief expiratory wheeze heard once, no rhonchi or rales  CV: regular rate and rhythm, normal S1 S2, no S3 or S4, no murmur, click or rub  PSYCH: mentation appears normal, affect normal/bright        "

## 2024-02-29 NOTE — PROGRESS NOTES
PATIENT HISTORY:  Dr. Italo Wright requested I see this patient for their foot issue.  Jac Owens is a 78 year old male who presents to clinic for left foot corn.  Has been there for 2 weeks.  Notes it is an aching pain.  Pain can be 7 out of 10 when walking.  He has tried scraping it which may help a little bit.  Denies specific injury.  Wondering what can be done to get rid of it.    Review of Systems:  Patient denies fever, chills, rash, wound, stiffness,  numbness, weakness, heart burn, blood in stool, chest pain with activity, calf pain when walking, shortness of breath with activity, chronic cough, easy bleeding/bruising, swelling of ankles, excessive thirst, fatigue, depression, anxiety.  Patient admits to limping at times. .     PAST MEDICAL HISTORY:   Past Medical History:   Diagnosis Date    Cancer (H)     COPD (chronic obstructive pulmonary disease) (H)     Hypertension     Uncomplicated asthma         PAST SURGICAL HISTORY:   Past Surgical History:   Procedure Laterality Date    ABDOMEN SURGERY      Paraesophageal hernia, JIST Tumor        MEDICATIONS:   Current Outpatient Medications:     allopurinol (ZYLOPRIM) 300 MG tablet, Take 1 tablet by mouth daily, Disp: , Rfl:     calcium carbonate (TUMS) 500 MG chewable tablet, Take 1 chew tab by mouth as needed for heartburn, Disp: , Rfl:     cyclobenzaprine (FLEXERIL) 5 MG tablet, Take 5 mg by mouth as needed for muscle spasms, Disp: , Rfl:     doxylamine (UNISOM) 12.5 mg TABS half-tab, Take 12.5 mg by mouth at bedtime, Disp: , Rfl:     famotidine (PEPCID) 40 MG tablet, Take 40 mg by mouth 2 times daily, Disp: , Rfl:     fluticasone (FLONASE) 50 MCG/ACT nasal spray, 2 sprays daily, Disp: , Rfl:     ipratropium-albuterol (COMBIVENT RESPIMAT)  MCG/ACT inhaler, Inhale 1 puff into the lungs as needed for shortness of breath, wheezing or cough, Disp: , Rfl:     lisinopril (ZESTRIL) 20 MG tablet, Take 20 mg by mouth daily, Disp: , Rfl:     loratadine  (CLARITIN) 10 MG tablet, Take 10 mg by mouth daily, Disp: , Rfl:     metFORMIN (GLUCOPHAGE XR) 500 MG 24 hr tablet, Take 1 tablet by mouth daily, Disp: , Rfl:     pantoprazole (PROTONIX) 40 MG EC tablet, Take 40 mg by mouth daily Takes 2 per day, Disp: , Rfl:     simvastatin (ZOCOR) 40 MG tablet, Take 40 mg by mouth daily, Disp: , Rfl:     triamcinolone (KENALOG) 0.1 % external ointment, Apply topically as needed for irritation, Disp: , Rfl:      ALLERGIES:    Allergies   Allergen Reactions    Bee Venom Unknown    Cats     Seasonal Allergies         SOCIAL HISTORY:   Social History     Socioeconomic History    Marital status:      Spouse name: Not on file    Number of children: Not on file    Years of education: Not on file    Highest education level: Not on file   Occupational History    Not on file   Tobacco Use    Smoking status: Never    Smokeless tobacco: Never   Substance and Sexual Activity    Alcohol use: Never    Drug use: Never    Sexual activity: Not Currently     Partners: Female   Other Topics Concern    Parent/sibling w/ CABG, MI or angioplasty before 65F 55M? No   Social History Narrative    Not on file     Social Determinants of Health     Financial Resource Strain: Low Risk  (12/20/2023)    Financial Resource Strain     Within the past 12 months, have you or your family members you live with been unable to get utilities (heat, electricity) when it was really needed?: No   Food Insecurity: Low Risk  (12/20/2023)    Food Insecurity     Within the past 12 months, did you worry that your food would run out before you got money to buy more?: No     Within the past 12 months, did the food you bought just not last and you didn t have money to get more?: No   Transportation Needs: Low Risk  (12/20/2023)    Transportation Needs     Within the past 12 months, has lack of transportation kept you from medical appointments, getting your medicines, non-medical meetings or appointments, work, or from  getting things that you need?: No   Physical Activity: Not on file   Stress: Not on file   Social Connections: Not on file   Interpersonal Safety: Low Risk  (12/27/2023)    Interpersonal Safety     Do you feel physically and emotionally safe where you currently live?: Yes     Within the past 12 months, have you been hit, slapped, kicked or otherwise physically hurt by someone?: No     Within the past 12 months, have you been humiliated or emotionally abused in other ways by your partner or ex-partner?: No   Housing Stability: Low Risk  (12/20/2023)    Housing Stability     Do you have housing? : Yes     Are you worried about losing your housing?: No        FAMILY HISTORY:   Family History   Problem Relation Age of Onset    Hypertension Father         EXAM:Vitals: /78   Wt 71.7 kg (158 lb)   BMI 23.33 kg/m    BMI= Body mass index is 23.33 kg/m .    A1C: 6.2 (12/27/2024)    General appearance: Patient is alert and fully cooperative with history & exam.  No sign of distress is noted during the visit.     Psychiatric: Affect is pleasant & appropriate.  Patient appears motivated to improve health.     Respiratory: Breathing is regular & unlabored while sitting.     HEENT: Hearing is intact to spoken word.  Speech is clear.  No gross evidence of visual impairment that would impact ambulation.     Dermatologic: Localized hyperkeratotic lesion to the lateral aspect of the right fourth PIPJ.  No open lesions or signs of acute infection noted.  Diffuse scaling to the skin on both feet.     Vascular: DP & PT pulses are intact & regular bilaterally.  No significant edema or varicosities noted.  CFT and skin temperature is normal to both lower extremities.     Neurologic: Lower extremity sensation is intact to light touch.  No evidence of weakness or contracture in the lower extremities.  No evidence of neuropathy.     Musculoskeletal: Patient is ambulatory without assistive device or brace.  No gross ankle deformity  noted.  No foot or ankle joint effusion is noted.     ASSESSMENT:    Right foot pain  Type 2 diabetes mellitus without complication, without long-term current use of insulin (H)  Pre-ulcerative calluses       Medical Decision Making/Plan:  Reviewed patient's chart in epic. Discussed causes of keratomas.  They are due to areas of increase friction.  Hammertoes can create these as they put more pressure to the metatarsal head.  Discussed treatments such as using foot file, pumice stone, metatarsal pads, orthotics, and not walking barefoot.     We discussed the cost structure of callus care if they were to come back and have it treated in the clinic if insurance does not cover it and explained that they would be billed. They were also provided information on places to get the callus treatment.    At this time recommend using a foot file or a pumice stone to the area to help keep the callus from building up.  Also recommended toe cover to help cushion the area.  Recommend using urea cream daily to the feet to help with dryness.    All questions were answered patient's satisfaction he will call further questions or concerns.    Patient risk factor: Patient is at low risk for infection. .        Carissa Sen DPM, Podiatry/Foot and Ankle Surgery

## 2024-02-29 NOTE — PATIENT INSTRUCTIONS
Thank you for choosing [facility] Podiatry / Foot & Ankle Surgery!    DR TRIMBLE'S CLINIC:  [facility]   [address]   [place] [address]      TRIAGE LINE: [phone]  APPOINTMENTS: [phone]  RADIOLOGY: [phone]  SET UP SURGERY: [phone]  PHYSICAL THERAPY: [phone]   FAX NUMBER: [phone]  BILLING QUESTIONS: [phone]       Follow up: As needed    20% UREA CREAM TO FEET DAILY    [email or web address] or call [phone]"[employer]  TOE COVERS/CAPS         CALLUS / CORNS / IPKs  When there is excessive friction or pressure on the skin, the body responds by making the skin thicker to protect the deeper structures from becoming exposed. While this works well to protect the deeper structures, the thickened skin can increase pressure and pain.    CALLUS: Flat, diffuse thickening are simple calluses and they are usually caused by friction. Often these are the result of rubbing on a shoe or going barefoot.    CORNS: Calluses with a central core between the toes are called corns. These result from prominent joints on adjacent toes rubbing together. Theses are a symptom of bone malalignment and will always recur unless the underlying bones are addressed surgically.    IPKs: Calluses with a central core on the ball of the foot are usually IPKs (intractable plantar keratosis). These are caused by excessive pressure from the metatarsals, the bones that make up the ball of the foot. Often one of these bones is too long or too prominent.  Again, these will always recur unless the underlying bone issue is addressed. There is no cure for these. They will either go away by themselves, recur, or more could develop.    ROUTINE MAINTENANCE  1. File them down with a pumice stone or callus file a couple times a week.   2. An electric callus removing device. Amope Pedi Perfect Electronic Pedicure Foot File and Callus Remover can be a good option.   3. Lotion can be applied to soften the callus.  A urea based cream such as Kersal or Vanicream or thicker cream with shea butter are good options.  4. Toe spacers or toe covers can be used for corns, gel pads can be used for other lesions on the bottom of the foot.   If there is a surgical pathology noted, such as a prominent bone, often this needs to be addressed surgically to minimize recurrence. However, sometimes the lesion simply migrates to another spot after surgery, so it is not a guaranteed cure.     **If you come back to clinic for treatment, insurance does not cover it, and you would be billed. This charge could range from $100 - $227**     Here is a list of routine foot care resources, which includes toenail trimming and callus/corn management.     This is not a referral. It is your responsibility to contact the organization and your insurance to confirm cost and coverage.      ROUTINE FOOT CARE (NAIL TRIMMING / CALLUSES)      [facility]  [phone]   [facility]  [phone]  Multiple locations   [facility]  [phone] Dr. Benjamin and Dr. Haynes  [address]  [place] [address]  [phone]   [facility]  [phone]  [facility]

## 2024-03-01 ENCOUNTER — OFFICE VISIT (OUTPATIENT)
Dept: PULMONOLOGY | Facility: CLINIC | Age: 79
End: 2024-03-01
Payer: MEDICARE

## 2024-03-01 DIAGNOSIS — R05.3 CHRONIC COUGH: ICD-10-CM

## 2024-03-01 PROCEDURE — 94729 DIFFUSING CAPACITY: CPT | Performed by: INTERNAL MEDICINE

## 2024-03-01 PROCEDURE — 94375 RESPIRATORY FLOW VOLUME LOOP: CPT | Performed by: INTERNAL MEDICINE

## 2024-03-01 NOTE — PROGRESS NOTES
Jac Owens comes into clinic today at the request of KYLE GRAVES Ordering Provider for PFT    Tolerated testing well. No adverse reactions. Left lab in no distress.        SABRINA GARCIA

## 2024-03-04 LAB
DLCOUNC-%PRED-PRE: 80 %
DLCOUNC-PRE: 18.13 ML/MIN/MMHG
DLCOUNC-PRED: 22.54 ML/MIN/MMHG
ERV-%PRED-PRE: 56 %
ERV-PRE: 0.67 L
ERV-PRED: 1.19 L
EXPTIME-PRE: 9.3 SEC
FEF2575-%PRED-POST: 39 %
FEF2575-%PRED-PRE: 39 %
FEF2575-POST: 0.73 L/SEC
FEF2575-PRE: 0.73 L/SEC
FEF2575-PRED: 1.85 L/SEC
FEFMAX-%PRED-PRE: 88 %
FEFMAX-PRE: 5.99 L/SEC
FEFMAX-PRED: 6.79 L/SEC
FEV1-%PRED-PRE: 70 %
FEV1-PRE: 1.76 L
FEV1FEV6-PRE: 60 %
FEV1FEV6-PRED: 77 %
FEV1FVC-PRE: 57 %
FEV1FVC-PRED: 76 %
FEV1SVC-PRE: 55 %
FEV1SVC-PRED: 68 %
FIFMAX-PRE: 4.95 L/SEC
FRCPLETH-%PRED-PRE: 95 %
FRCPLETH-PRE: 3.42 L
FRCPLETH-PRED: 3.6 L
FVC-%PRED-PRE: 93 %
FVC-PRE: 3.06 L
FVC-PRED: 3.27 L
IC-%PRED-PRE: 106 %
IC-PRE: 2.53 L
IC-PRED: 2.38 L
RVPLETH-%PRED-PRE: 101 %
RVPLETH-PRE: 2.75 L
RVPLETH-PRED: 2.72 L
TLCPLETH-%PRED-PRE: 91 %
TLCPLETH-PRE: 5.95 L
TLCPLETH-PRED: 6.52 L
VA-%PRED-PRE: 88 %
VA-PRE: 5.05 L
VC-%PRED-PRE: 87 %
VC-PRE: 3.2 L
VC-PRED: 3.65 L

## 2024-03-11 ENCOUNTER — MYC MEDICAL ADVICE (OUTPATIENT)
Dept: INTERNAL MEDICINE | Facility: CLINIC | Age: 79
End: 2024-03-11
Payer: MEDICARE

## 2024-03-11 DIAGNOSIS — J40 BRONCHITIS: Primary | ICD-10-CM

## 2024-03-12 RX ORDER — PREDNISONE 20 MG/1
40 TABLET ORAL DAILY
Qty: 10 TABLET | Refills: 0 | Status: SHIPPED | OUTPATIENT
Start: 2024-03-12 | End: 2024-03-25

## 2024-03-12 RX ORDER — AZITHROMYCIN 250 MG/1
TABLET, FILM COATED ORAL
Qty: 6 TABLET | Refills: 0 | Status: SHIPPED | OUTPATIENT
Start: 2024-03-12 | End: 2024-03-17

## 2024-03-20 ENCOUNTER — ANCILLARY PROCEDURE (OUTPATIENT)
Dept: MRI IMAGING | Facility: CLINIC | Age: 79
End: 2024-03-20
Attending: STUDENT IN AN ORGANIZED HEALTH CARE EDUCATION/TRAINING PROGRAM
Payer: MEDICARE

## 2024-03-20 DIAGNOSIS — R97.20 ELEVATED PSA: ICD-10-CM

## 2024-03-20 PROCEDURE — G1010 CDSM STANSON: HCPCS | Performed by: RADIOLOGY

## 2024-03-20 PROCEDURE — A9585 GADOBUTROL INJECTION: HCPCS | Performed by: RADIOLOGY

## 2024-03-20 PROCEDURE — 72197 MRI PELVIS W/O & W/DYE: CPT | Mod: MG | Performed by: RADIOLOGY

## 2024-03-20 RX ORDER — GADOBUTROL 604.72 MG/ML
7.5 INJECTION INTRAVENOUS ONCE
Status: COMPLETED | OUTPATIENT
Start: 2024-03-20 | End: 2024-03-20

## 2024-03-20 RX ADMIN — GADOBUTROL 7 ML: 604.72 INJECTION INTRAVENOUS at 16:21

## 2024-03-20 NOTE — DISCHARGE INSTRUCTIONS
MRI Contrast Discharge Instructions    The IV contrast you received today will pass out of your body in your  urine. This will happen in the next 24 hours. You will not feel this process.  Your urine will not change color.    Drink at least 4 extra glasses of water or juice today (unless your doctor  has restricted your fluids). This reduces the stress on your kidneys.  You may take your regular medicines.    If you are on dialysis: It is best to have dialysis today.    If you have a reaction: Most reactions happen right away. If you have  any new symptoms after leaving the hospital (such as hives or swelling),  call your hospital at the correct number below. Or call your family doctor.  If you have breathing distress or wheezing, call 911.    Special instructions: ***    I have read and understand the above information.    Signature:______________________________________ Date:___________    Staff:__________________________________________ Date:___________     Time:__________    [facility] Departments:    ___[place]: [phone]  ___[facility]: [phone]  ___[place]: [phone] ___[facility]: [phone]  ___[facility]: [phone]  ___[facility]: [phone]  ___[phone]  ___[facility]: [phone]  ___[phone]

## 2024-03-22 ENCOUNTER — TELEPHONE (OUTPATIENT)
Dept: UROLOGY | Facility: CLINIC | Age: 79
End: 2024-03-22
Payer: MEDICARE

## 2024-03-22 DIAGNOSIS — R97.20 ELEVATED PSA: Primary | ICD-10-CM

## 2024-03-22 NOTE — TELEPHONE ENCOUNTER
Patient Contacted for the patient to call back and schedule the following:    Attempted call on main phone number listed, no answer, but [email or web address] message was sent today by provider     Appointment type: Return  Provider: Robi Lenz  Return date: June 2024  Specialty phone number: [phone]  Additional appointment(s) needed: PSA lab prior to follow up   Additonal Notes: repeat PSA in 3 months with a follow-up virtual visit with Robi Lenz

## 2024-03-22 NOTE — TELEPHONE ENCOUNTER
Clinic coordinators, would you please reach out to this patient and help him schedule a repeat PSA in 3 months with a follow-up virtual visit with me shortly afterwards?  Thank you!

## 2024-03-25 ENCOUNTER — MYC MEDICAL ADVICE (OUTPATIENT)
Dept: INTERNAL MEDICINE | Facility: CLINIC | Age: 79
End: 2024-03-25
Payer: MEDICARE

## 2024-03-25 DIAGNOSIS — J45.30 MILD PERSISTENT ASTHMA, UNSPECIFIED WHETHER COMPLICATED: Primary | ICD-10-CM

## 2024-03-25 DIAGNOSIS — J40 BRONCHITIS: ICD-10-CM

## 2024-03-25 RX ORDER — AZITHROMYCIN 250 MG/1
TABLET, FILM COATED ORAL
Qty: 6 TABLET | Refills: 0 | Status: SHIPPED | OUTPATIENT
Start: 2024-03-25 | End: 2024-05-07

## 2024-03-25 RX ORDER — PREDNISONE 20 MG/1
40 TABLET ORAL DAILY
Qty: 10 TABLET | Refills: 0 | Status: SHIPPED | OUTPATIENT
Start: 2024-03-25 | End: 2024-06-10

## 2024-03-26 NOTE — TELEPHONE ENCOUNTER
Patient Contacted for the patient to call back and schedule the following:  Second call attempt, unable to LVM, pt read [employer] message     Appointment type: Return  Provider: Robi Lenz  Return date: June 2024  Specialty phone number: [phone]  Additional appointment(s) needed: PSA lab prior  Additonal Notes: 3 month PSA follow up

## 2024-03-29 RX ORDER — INHALER, ASSIST DEVICES
SPACER (EA) MISCELLANEOUS
Qty: 1 EACH | Refills: 0 | Status: SHIPPED | OUTPATIENT
Start: 2024-03-29

## 2024-03-29 RX ORDER — BUDESONIDE AND FORMOTEROL FUMARATE DIHYDRATE 80; 4.5 UG/1; UG/1
2 AEROSOL RESPIRATORY (INHALATION) 2 TIMES DAILY
Qty: 10.2 G | Refills: 1 | Status: SHIPPED | OUTPATIENT
Start: 2024-03-29 | End: 2024-05-30

## 2024-04-06 ENCOUNTER — HEALTH MAINTENANCE LETTER (OUTPATIENT)
Age: 79
End: 2024-04-06

## 2024-04-19 NOTE — TELEPHONE ENCOUNTER
Action May 1 2024 3:18 PM ABT   Action Taken Called [facility] [phone] and spoke to David, states that they sent images via [employer]. Email sent to  imaging team to resolve images to PACS    Called [facility] Image Library [phone] and spoke to Adrian, states they dont have anything on file but if another request is sent they can mail out disc today.    May 7, 2024 10:37AM  Called [facility] unavailable LVM for image request to be sent through [employer] since we cannot use [employer] and they cannot push images. Request also faxed over to [phone]. Email sent to  imaging to watch for image from [facility].    12:33PM  Email from [facility] team, they sill have not received images but will still be on the look out.    1:20PM  IB from RN and provider regarding images. Called over to [facility] again [phone] still unable to speak to team regarding images. IB to RN and provider. LVM for callback on how we can get images.     RECORDS STATUS - ALL OTHER DIAGNOSIS      RECORDS RECEIVED FROM: [facility]   DATE RECEIVED:    NOTES STATUS DETAILS   OFFICE NOTE from referring provider SELF    OFFICE NOTE from other specialist [facility] Neuro:  23: Dr. Ady Pires    Neuro Sur21: Dr. Reinaldo Baca  21: Dr. Ady Ledezma    Rad Onc:  21: Dr. Guillermo Norris   MEDICATION LIST Epic    LABS     PATHOLOGY REPORTS     ANYTHING RELATED TO DIAGNOSIS Epic Most recent 23   IMAGING (NEED IMAGES & REPORT)     MRI Req  [facility]:  23-24: MR Brain    [place]:  21: MR Brain   IMAGING [employer]:     [place] [facility]  [address] [employer]

## 2024-04-24 ENCOUNTER — TRANSFERRED RECORDS (OUTPATIENT)
Dept: MULTI SPECIALTY CLINIC | Facility: CLINIC | Age: 79
End: 2024-04-24
Payer: MEDICARE

## 2024-04-24 LAB — RETINOPATHY: NORMAL

## 2024-05-02 ENCOUNTER — MYC MEDICAL ADVICE (OUTPATIENT)
Dept: INTERNAL MEDICINE | Facility: CLINIC | Age: 79
End: 2024-05-02
Payer: MEDICARE

## 2024-05-07 ENCOUNTER — ONCOLOGY VISIT (OUTPATIENT)
Dept: ONCOLOGY | Facility: CLINIC | Age: 79
End: 2024-05-07
Attending: PSYCHIATRY & NEUROLOGY
Payer: MEDICARE

## 2024-05-07 ENCOUNTER — HOSPITAL ENCOUNTER (OUTPATIENT)
Dept: MRI IMAGING | Facility: CLINIC | Age: 79
Discharge: HOME | End: 2024-05-07
Attending: PSYCHIATRY & NEUROLOGY | Admitting: PSYCHIATRY & NEUROLOGY
Payer: MEDICARE

## 2024-05-07 ENCOUNTER — PRE VISIT (OUTPATIENT)
Dept: ONCOLOGY | Facility: CLINIC | Age: 79
End: 2024-05-07
Payer: MEDICARE

## 2024-05-07 VITALS
HEIGHT: 67 IN | TEMPERATURE: 97.4 F | OXYGEN SATURATION: 94 % | WEIGHT: 155 LBS | BODY MASS INDEX: 24.33 KG/M2 | SYSTOLIC BLOOD PRESSURE: 120 MMHG | RESPIRATION RATE: 18 BRPM | HEART RATE: 63 BPM | DIASTOLIC BLOOD PRESSURE: 79 MMHG

## 2024-05-07 DIAGNOSIS — G93.9 BRAIN LESION: ICD-10-CM

## 2024-05-07 DIAGNOSIS — R93.7 ABNORMAL MRI, LUMBAR SPINE: ICD-10-CM

## 2024-05-07 DIAGNOSIS — G93.9 BRAIN LESION: Primary | ICD-10-CM

## 2024-05-07 DIAGNOSIS — R90.89 MRI OF BRAIN ABNORMAL: ICD-10-CM

## 2024-05-07 DIAGNOSIS — H51.9 EYE MOVEMENT IMPAIRMENT: ICD-10-CM

## 2024-05-07 PROCEDURE — 99205 OFFICE O/P NEW HI 60 MIN: CPT | Performed by: PSYCHIATRY & NEUROLOGY

## 2024-05-07 PROCEDURE — A9585 GADOBUTROL INJECTION: HCPCS | Performed by: PSYCHIATRY & NEUROLOGY

## 2024-05-07 PROCEDURE — G0463 HOSPITAL OUTPT CLINIC VISIT: HCPCS | Performed by: PSYCHIATRY & NEUROLOGY

## 2024-05-07 PROCEDURE — 255N000002 HC RX 255 OP 636: Performed by: PSYCHIATRY & NEUROLOGY

## 2024-05-07 PROCEDURE — 70553 MRI BRAIN STEM W/O & W/DYE: CPT

## 2024-05-07 PROCEDURE — 72158 MRI LUMBAR SPINE W/O & W/DYE: CPT | Mod: MG

## 2024-05-07 RX ORDER — GADOBUTROL 604.72 MG/ML
15 INJECTION INTRAVENOUS ONCE
Status: COMPLETED | OUTPATIENT
Start: 2024-05-07 | End: 2024-05-07

## 2024-05-07 RX ADMIN — GADOBUTROL 15 ML: 604.72 INJECTION INTRAVENOUS at 11:23

## 2024-05-07 ASSESSMENT — PAIN SCALES - GENERAL: PAINLEVEL: NO PAIN (0)

## 2024-05-07 NOTE — LETTER
5/7/2024         RE: Jac Owens  [id number]  [place] [address]        Dear Colleague,    Thank you for referring your patient, Jac Owens, to the [facility]. Please see a copy of my visit note below.    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 This symptom has since resolved. 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     He is presenting to this initial clinic visit for evaluation and recommendations for ongoing management.     HISTORY OF PRESENT ILLNESS  A summary of the patient s oncologic history is as follows;   -History of GI stromal tumor (GIST)-  -3/15/2021 SURGERY: Gastrectomy for resection.   History of achalasia.    -Brain lesion-  -PRESENTATION: New onset diplopia.  -4/4/2021 MR brain imaging with an abnormal signal in left midbrain and cerebellar vermis with a punctate focus of enhancement.   -4/15/2021 CT of chest, abdomen and pelvis postsurgical changes of GEJ gastrointestinal stromal tumor (GIST) resection with no evidence of metastatic disease.  -4/16/2021 MR L-Spine with a single punctate enhancing focus likely along left outgoing sacral nerve root, which is nonspecific although may reflect a potential drop metastasis in the setting of enhancing and nonenhancing lesions of the brain, or other lesion. No abnormal enhancing lesion seen in the cervical or thoracic spine or cord.  -9/29/2021 MR brain imaging with an  infiltrative appearing FLAIR hyperintense lesion within the cerebellar vermis without contrast enhancement is seen which may represent a low grade tumor. This is unchanged from the prior study. The previously noted hyperenhancing nodule along the left aspect of the cerebral aqueduct is again not visualized on this exam.  -9/29/2021 MRI L-spine with a punctate foci of sacral nerve root enhancement noted on MRI L-spine, likely schwannoma or ependymoma, is overall unchanged.  -5/2/2022 MR brain imaging was overall stable compared to prior imaging in 1/3/22, demonstrating stable FLAIR/T2 hyperintense vermis lesion. No new contrast enhancement noted on MRI Brain. Prior punctate foci of enhancement in left cerebral aqueduct noted on original April 2021 brain MRI has now resolved.  -5/2/2022 MR L-spine demonstrated overall unchanged punctate foci of sacral nerve root enhancement compared to prior L-spine MRI on 9/29/2021.  -11/2/2022 MR brain imaging with no significant interval change in the inferior medullary velum to cerebellar vermian lesion without associated enhancement, as compared to multiple prior studies dating back to 9/29/2021. This is likely reflective of low-grade neoplasm including subependymoma or other glial lesion.  -8/2/2023 MR brain imaging with static inferior vermian lesion measuring approximately 14 mm, unchanged mild atrophy.  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  -5/7/2024 NEURO-ONC/ MR brain & L-spine imaging: No new neurological concerns. All imaging stable. Recommending ongoing imaging surveillance.    Today in clinic;   -Jac no longer experiences any diplopia.    At times, it is slow for him to focus his eyes. Open to establishing care with neuro-ophtho.   -No headaches.    No back pain. No numbness, weakness. No concerns with bladder/ bowel function.    -Off all steroids.      MEDICATIONS   Current Outpatient Medications   Medication Sig Dispense Refill     allopurinol (ZYLOPRIM) 300 MG tablet Take 1 tablet by mouth daily       budesonide-formoterol (SYMBICORT) 80-4.5 MCG/ACT Inhaler Inhale 2 puffs into the lungs 2 times daily 10.2 g 1     calcium carbonate (TUMS) 500 MG chewable tablet Take 1 chew tab by mouth as needed for heartburn       cyclobenzaprine (FLEXERIL) 5 MG tablet Take 5 mg by mouth as needed for muscle spasms       doxylamine (UNISOM) 12.5 mg TABS half-tab Take 12.5 mg by mouth at bedtime       famotidine (PEPCID) 40 MG tablet Take 40 mg by mouth 2 times daily       fluticasone (FLONASE) 50 MCG/ACT nasal spray 2 sprays daily       ipratropium-albuterol (COMBIVENT RESPIMAT)  MCG/ACT inhaler Inhale 1 puff into the lungs as needed for shortness of breath, wheezing or cough       lisinopril (ZESTRIL) 20 MG tablet Take 20 mg by mouth daily       loratadine (CLARITIN) 10 MG tablet Take 10 mg by mouth daily       metFORMIN (GLUCOPHAGE XR) 500 MG 24 hr tablet Take 1 tablet by mouth daily       pantoprazole (PROTONIX) 40 MG EC tablet Take 40 mg by mouth daily Takes 2 per day       predniSONE (DELTASONE) 20 MG tablet Take 2 tablets (40 mg) by mouth daily for 5 days 10 tablet 0     simvastatin (ZOCOR) 40 MG tablet Take 40 mg by mouth daily       spacer (OPTICHAMBER ADEN) holding chamber For use with inhaler 1 each 0     triamcinolone (KENALOG) 0.1 % external ointment Apply topically as needed for irritation       DRUG ALLERGIES   Allergies   Allergen Reactions     Bee Venom Unknown     Cats      Seasonal Allergies        IMMUNIZATIONS   Immunization History   Administered Date(s) Administered     COVID-19 Monovalent 18+ (Moderna) 02/06/2021, 03/09/2021     Flu, Unspecified 10/01/2020     M0v6-33 Novel Flu 01/05/2010     E2v8-00 Novel Flu P-free 01/05/2010     Hepatitis A (ADULT 19+) 06/02/2004     Hepatitis B, Adult 06/02/2004     Influenza (High  "Dose) 3 valent vaccine 09/20/2015, 11/20/2015, 11/21/2016, 09/16/2017, 09/26/2018, 10/10/2019     Influenza Vaccine 65+ (FLUAD) 09/10/2021, 10/02/2022     Influenza Vaccine 65+ (Fluzone HD) 11/10/2020     Influenza, seasonal, injectable, PF 11/14/2010     Pneumo Conj 13-V (2010&after) 01/15/2016     Pneumococcal 23 valent 01/10/2001, 09/21/2012     RSV Vaccine (Arexvy) 11/29/2023     TDAP (Adacel,Boostrix) 09/21/2012     Td (Adult), Adsorbed 06/02/2004     Typhoid Oral 09/10/2013     Yellow Fever 10/18/2013     Zoster recombinant adjuvanted (SHINGRIX) 04/17/2018, 09/08/2018       PAST MEDICAL HISTORY   Past Medical History:   Diagnosis Date     Cancer (H)      COPD (chronic obstructive pulmonary disease) (H)      Hypertension      Type 2 diabetes mellitus without complication, without long-term current use of insulin (H) 2/29/2024     Uncomplicated asthma      PAST SURGICAL HISTORY   Past Surgical History:   Procedure Laterality Date     ABDOMEN SURGERY      Paraesophageal hernia, JIST Tumor     SOCIAL HISTORY   History   Smoking Status     Never   Smokeless Tobacco     Never    Social History    Substance and Sexual Activity      Alcohol use: Never     History   Drug Use Unknown     Retired     FAMILY HISTORY   Family History   Problem Relation Age of Onset     Hypertension Father        PHYSICAL EXAMINATION  /79 (BP Location: Left arm, Patient Position: Sitting, Cuff Size: Adult Regular)   Pulse 63   Temp 97.4  F (36.3  C) (Oral)   Resp 18   Ht 1.712 m (5' 7.4\")   Wt 70.3 kg (155 lb)   SpO2 94%   BMI 23.99 kg/m    Wt Readings from Last 2 Encounters:   05/07/24 70.3 kg (155 lb)   02/29/24 71.7 kg (158 lb)      Ht Readings from Last 2 Encounters:   05/07/24 1.712 m (5' 7.4\")   02/29/24 1.753 m (5' 9\")     KPS: 100    -Generally well appearing.  -Respiratory: Normal breath sounds, no audible wheezing.   -Psychiatric: Normal mood and affect. Pleasant, talkative.  -Neurologic:   MENTAL STATUS:  " "   Alert, oriented to date.    Recall: Intact    Speech fluent.   Comprehension intact to multi-step commands.   Good right-left orientation.     CRANIAL NERVES:     Pupils are equal, round.     Extraocular movements full, denies diplopia.     Visual fields full.     Facial sensation intact to light touch.   Symmetric facial movements.   Hearing intact.   No dysarthria.   MOTOR: No pronation or drift.  SENSATION: Intact to light touch throughout.  COORDINATION: Intact finger-nose with eyes closed.   GAIT:  Walks without assistance.   Good speed. Normal stride length and heel strike. Normal turns. Normal arm swing.   Able to toe, heel walk. Able to tandem walk.       MEDICAL RECORDS  Personally reviewed records from Dr. Ady Pires, neuro-oncology at [facility].    LABS  Personally reviewed all available lab results; PSA 12.7, TSH 1.93 (12/27/2023).    IMAGING  Personally reviewed MR brain and L-spine imaging from today. To my eye, the non-contrast enhancing lesion about the vermis is without an increase in perfusion or diffusion. The extent of the T2 FLAIR remains about ~15mmg (below).      Formal read; \"1. Nonspecific nonenhancing T2/T2 FLAIR hyperintense, T1 hypointense nodular lesion in the anterior vermis with mild surrounding T2 FLAIR hyperintense parenchymal signal. The findings are nonspecific, but may reflect a primary brain neoplasm (likely low-grade, such as a multinodular and vacuolating posterior fossa lesion or low-grade astrocytoma), or a dysplastic or hamartomatous lesion. Continued follow-up is recommended. 2. Generalized brain atrophy. 3. No acute intracranial abnormality.\"    The contrast enhancing nodule along the L5 nerve root remains ~3-4mm (below).       Formal read; \"1. Homogeneously enhancing intradural nodule intimately associated with the lower cauda equina nerves at the level of L5 measuring 4-5 mm. This is nonspecific, but may represent a peripheral nerve sheath tumor " "such as a schwannoma. 2. Multilevel degenerative changes. 3. Mild to moderate bordering on moderate central spinal canal stenosis at L2-L3 with mild/mild to moderate spinal canal narrowing elsewhere. 4. Mild/mild to moderate multilevel neural foraminal stenosis.\"    Imaging was shown to and results were reviewed with Jac.       IMPRESSION  On date of service, 46 minutes was spent in clinic and 26 minutes was spent preparing for the visit through extensive chart review and coordinating care for this high complexity visit. The following is in explanation for the recommendations used to define the plan.       Fortunately both MR brain and spine imaging from today remains stable. I personally reviewed Jac's imaging and case at Brain Tumor Conference ([facility]) and all in attendance were in agreement with this impression.      Neurologically, Jac denies any new concerns. While he does not experience diplopia, there is impairment with focusing his vision. We discussed the option of establishing care with neuro-ophtho and Jac is in agreement. I placed the referral.     With regard to his brain imaging; As discussed at BT, the differential for the etiology of this lesion remains broad and includes lesions of low grade etiology like, glioneuronal turmor or a multinodular and vacuolating neuronal tumor (MVNT), but ultimately, definite diagnosis can only be made on review of the lesion's pathology. Given that Jac is asymptomatic plus imaging demonstrated no significant change since 2021 plus there are no new concerning radiographic findings on the most recent scan, there is no intervention, diagnostic or therapeutic, indicated at this time. The risks associated with a biopsy out weigh the benefit of obtaining a diagnosis at this time. Therefore, it would be most advantageous to continue with serial MR brain imaging surveillance. If the risks/ benefit ratio changes with time as the result of new imaging findings " "and/ or progressive neurological/ clinical complaints, then a new plan can be devised.     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 This finding is also asymptomatic.     Therefore, since imaging has remained stable for > 3 years, the plan is to repeat imaging per NCCN guidelines on an annual basis. I cautioned Jac to call the clinic and report any new symptoms/ concerns and imaging can be scheduled sooner. Jac is in agreement with this plan.      PROBLEM LIST  Brain lesion NOS  Abnormal brain imaging    PLAN  -CANCER-DIRECTED THERAPY-  -Continue imaging surveillance.   -Repeat MR brain + spectroscopy in 12 months.   -Referral to neuro-ophtho.     -SEIZURE MANAGEMENT-  -Given the lack of seizure history, there is no indication to prescribe an antiepileptic at this time.     Return to clinic in 5/2025 + imaging.      In the meantime, Jac knows to call the clinic with any concerns and she can be seen sooner if needed.     Jocelyn Cannon MD  Neuro-oncology       Oncology Rooming Note    May 7, 2024 1:15 PM   Jac Owens is a 78 year old male who presents for:    Chief Complaint   Patient presents with     Oncology Clinic Visit     Initial Vitals: /79 (BP Location: Left arm, Patient Position: Sitting, Cuff Size: Adult Regular)   Pulse 63   Temp 97.4  F (36.3  C) (Oral)   Resp 18   Ht 1.712 m (5' 7.4\")   Wt 70.3 kg (155 lb)   SpO2 94%   BMI 23.99 kg/m   Estimated body mass index is 23.99 kg/m  as calculated from the following:    Height as of this encounter: 1.712 m (5' 7.4\").    Weight as of this encounter: 70.3 kg (155 lb). Body surface area is 1.83 meters squared.  No Pain (0) Comment: Data Unavailable   No LMP for male patient.  Allergies reviewed: Yes  Medications reviewed: Yes    Medications: Medication refills not needed today.  Pharmacy name entered into EPIC: " [facility] #[id number] - [place], [place] - [address]    Frailty Screening:   Is the patient here for a new oncology consult visit in cancer care? 1. Yes. Over the past month, have you experienced difficulty or required a caregiver to assist with:   1. Balance, walking or general mobility (including any falls)? NO  2. Completion of self-care tasks such as bathing, dressing, toileting, grooming/hygiene?  NO  3. Concentration or memory that affects your daily life?  NO       Clinical concerns: no     Kimberly Zhang CMA                Again, thank you for allowing me to participate in the care of your patient.        Sincerely,        Jocelyn Cannon MD

## 2024-05-07 NOTE — PROGRESS NOTES
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 This symptom has since resolved. 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     He is presenting to this initial clinic visit for evaluation and recommendations for ongoing management.     HISTORY OF PRESENT ILLNESS  A summary of the patient s oncologic history is as follows;   -History of GI stromal tumor (GIST)-  -3/15/2021 SURGERY: Gastrectomy for resection.   History of achalasia.    -Brain lesion-  -PRESENTATION: New onset diplopia.  -4/4/2021 MR brain imaging with an abnormal signal in left midbrain and cerebellar vermis with a punctate focus of enhancement.   -4/15/2021 CT of chest, abdomen and pelvis postsurgical changes of GEJ gastrointestinal stromal tumor (GIST) resection with no evidence of metastatic disease.  -4/16/2021 MR L-Spine with a single punctate enhancing focus likely along left outgoing sacral nerve root, which is nonspecific although may reflect a potential drop metastasis in the setting of enhancing and nonenhancing lesions of the brain, or other lesion. No abnormal enhancing lesion seen in the cervical or thoracic spine or cord.  -9/29/2021 MR brain imaging with an infiltrative appearing FLAIR hyperintense lesion within the cerebellar vermis without contrast enhancement is seen which may represent a low grade tumor. This is unchanged from the prior study. The previously noted hyperenhancing nodule along the left aspect of the  cerebral aqueduct is again not visualized on this exam.  -9/29/2021 MRI L-spine with a punctate foci of sacral nerve root enhancement noted on MRI L-spine, likely schwannoma or ependymoma, is overall unchanged.  -5/2/2022 MR brain imaging was overall stable compared to prior imaging in 1/3/22, demonstrating stable FLAIR/T2 hyperintense vermis lesion. No new contrast enhancement noted on MRI Brain. Prior punctate foci of enhancement in left cerebral aqueduct noted on original April 2021 brain MRI has now resolved.  -5/2/2022 MR L-spine demonstrated overall unchanged punctate foci of sacral nerve root enhancement compared to prior L-spine MRI on 9/29/2021.  -11/2/2022 MR brain imaging with no significant interval change in the inferior medullary velum to cerebellar vermian lesion without associated enhancement, as compared to multiple prior studies dating back to 9/29/2021. This is likely reflective of low-grade neoplasm including subependymoma or other glial lesion.  -8/2/2023 MR brain imaging with static inferior vermian lesion measuring approximately 14 mm, unchanged mild atrophy.  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  -5/7/2024 NEURO-ONC/ MR brain & L-spine imaging: No new neurological concerns. All imaging stable. Recommending ongoing imaging surveillance.    Today in clinic;   -Jac no longer experiences any diplopia.    At times, it is slow for him to focus his eyes. Open to establishing care with neuro-ophtho.   -No headaches.    No back pain. No numbness, weakness. No concerns with bladder/ bowel function.   -Off all steroids.      MEDICATIONS   Current Outpatient Medications   Medication Sig Dispense Refill    allopurinol (ZYLOPRIM) 300 MG tablet Take 1 tablet by mouth daily      budesonide-formoterol (SYMBICORT) 80-4.5 MCG/ACT Inhaler Inhale 2 puffs into the lungs 2  times daily 10.2 g 1    calcium carbonate (TUMS) 500 MG chewable tablet Take 1 chew tab by mouth as needed for heartburn      cyclobenzaprine (FLEXERIL) 5 MG tablet Take 5 mg by mouth as needed for muscle spasms      doxylamine (UNISOM) 12.5 mg TABS half-tab Take 12.5 mg by mouth at bedtime      famotidine (PEPCID) 40 MG tablet Take 40 mg by mouth 2 times daily      fluticasone (FLONASE) 50 MCG/ACT nasal spray 2 sprays daily      ipratropium-albuterol (COMBIVENT RESPIMAT)  MCG/ACT inhaler Inhale 1 puff into the lungs as needed for shortness of breath, wheezing or cough      lisinopril (ZESTRIL) 20 MG tablet Take 20 mg by mouth daily      loratadine (CLARITIN) 10 MG tablet Take 10 mg by mouth daily      metFORMIN (GLUCOPHAGE XR) 500 MG 24 hr tablet Take 1 tablet by mouth daily      pantoprazole (PROTONIX) 40 MG EC tablet Take 40 mg by mouth daily Takes 2 per day      predniSONE (DELTASONE) 20 MG tablet Take 2 tablets (40 mg) by mouth daily for 5 days 10 tablet 0    simvastatin (ZOCOR) 40 MG tablet Take 40 mg by mouth daily      spacer (NeXplore ADEN) holding chamber For use with inhaler 1 each 0    triamcinolone (KENALOG) 0.1 % external ointment Apply topically as needed for irritation       DRUG ALLERGIES   Allergies   Allergen Reactions    Bee Venom Unknown    Cats     Seasonal Allergies        IMMUNIZATIONS   Immunization History   Administered Date(s) Administered    COVID-19 Monovalent 18+ (Moderna) 02/06/2021, 03/09/2021    Flu, Unspecified 10/01/2020    V1m0-63 Novel Flu 01/05/2010    L7n6-78 Novel Flu P-free 01/05/2010    Hepatitis A (ADULT 19+) 06/02/2004    Hepatitis B, Adult 06/02/2004    Influenza (High Dose) 3 valent vaccine 09/20/2015, 11/20/2015, 11/21/2016, 09/16/2017, 09/26/2018, 10/10/2019    Influenza Vaccine 65+ (FLUAD) 09/10/2021, 10/02/2022    Influenza Vaccine 65+ (Fluzone HD) 11/10/2020    Influenza, seasonal, injectable, PF 11/14/2010    Pneumo Conj 13-V (2010&after) 01/15/2016     "Pneumococcal 23 valent 01/10/2001, 09/21/2012    RSV Vaccine (Arexvy) 11/29/2023    TDAP (Adacel,Boostrix) 09/21/2012    Td (Adult), Adsorbed 06/02/2004    Typhoid Oral 09/10/2013    Yellow Fever 10/18/2013    Zoster recombinant adjuvanted (SHINGRIX) 04/17/2018, 09/08/2018       PAST MEDICAL HISTORY   Past Medical History:   Diagnosis Date    Cancer (H)     COPD (chronic obstructive pulmonary disease) (H)     Hypertension     Type 2 diabetes mellitus without complication, without long-term current use of insulin (H) 2/29/2024    Uncomplicated asthma      PAST SURGICAL HISTORY   Past Surgical History:   Procedure Laterality Date    ABDOMEN SURGERY      Paraesophageal hernia, JIST Tumor     SOCIAL HISTORY   History   Smoking Status    Never   Smokeless Tobacco    Never    Social History    Substance and Sexual Activity      Alcohol use: Never     History   Drug Use Unknown     Retired     FAMILY HISTORY   Family History   Problem Relation Age of Onset    Hypertension Father        PHYSICAL EXAMINATION  /79 (BP Location: Left arm, Patient Position: Sitting, Cuff Size: Adult Regular)   Pulse 63   Temp 97.4  F (36.3  C) (Oral)   Resp 18   Ht 1.712 m (5' 7.4\")   Wt 70.3 kg (155 lb)   SpO2 94%   BMI 23.99 kg/m    Wt Readings from Last 2 Encounters:   05/07/24 70.3 kg (155 lb)   02/29/24 71.7 kg (158 lb)      Ht Readings from Last 2 Encounters:   05/07/24 1.712 m (5' 7.4\")   02/29/24 1.753 m (5' 9\")     KPS: 100    -Generally well appearing.  -Respiratory: Normal breath sounds, no audible wheezing.   -Psychiatric: Normal mood and affect. Pleasant, talkative.  -Neurologic:   MENTAL STATUS:     Alert, oriented to date.    Recall: Intact    Speech fluent.   Comprehension intact to multi-step commands.   Good right-left orientation.     CRANIAL NERVES:     Pupils are equal, round.     Extraocular movements full, denies diplopia.     Visual fields full.     Facial sensation intact to light " "touch.   Symmetric facial movements.   Hearing intact.   No dysarthria.   MOTOR: No pronation or drift.  SENSATION: Intact to light touch throughout.  COORDINATION: Intact finger-nose with eyes closed.   GAIT:  Walks without assistance.   Good speed. Normal stride length and heel strike. Normal turns. Normal arm swing.   Able to toe, heel walk. Able to tandem walk.       MEDICAL RECORDS  Personally reviewed records from Dr. Ady Pires, neuro-oncology at [facility].    LABS  Personally reviewed all available lab results; PSA 12.7, TSH 1.93 (12/27/2023).    IMAGING  Personally reviewed MR brain and L-spine imaging from today. To my eye, the non-contrast enhancing lesion about the vermis is without an increase in perfusion or diffusion. The extent of the T2 FLAIR remains about ~15mmg (below).      Formal read; \"1. Nonspecific nonenhancing T2/T2 FLAIR hyperintense, T1 hypointense nodular lesion in the anterior vermis with mild surrounding T2 FLAIR hyperintense parenchymal signal. The findings are nonspecific, but may reflect a primary brain neoplasm (likely low-grade, such as a multinodular and vacuolating posterior fossa lesion or low-grade astrocytoma), or a dysplastic or hamartomatous lesion. Continued follow-up is recommended. 2. Generalized brain atrophy. 3. No acute intracranial abnormality.\"    The contrast enhancing nodule along the L5 nerve root remains ~3-4mm (below).       Formal read; \"1. Homogeneously enhancing intradural nodule intimately associated with the lower cauda equina nerves at the level of L5 measuring 4-5 mm. This is nonspecific, but may represent a peripheral nerve sheath tumor such as a schwannoma. 2. Multilevel degenerative changes. 3. Mild to moderate bordering on moderate central spinal canal stenosis at L2-L3 with mild/mild to moderate spinal canal narrowing elsewhere. 4. Mild/mild to moderate multilevel neural foraminal stenosis.\"    Imaging was shown to and results " were reviewed with Jac.       IMPRESSION  On date of service, 46 minutes was spent in clinic and 26 minutes was spent preparing for the visit through extensive chart review and coordinating care for this high complexity visit. The following is in explanation for the recommendations used to define the plan.       Fortunately both MR brain and spine imaging from today remains stable. I personally reviewed Jac's imaging and case at Brain Tumor Conference ([facility]) and all in attendance were in agreement with this impression. Of note, the group confirmed that repeat spectroscopy imaging was thought to be of no utility given the prior negative findings and that there was no change in the lesion over the past 3 years.     Neurologically, Jac denies any new concerns. While he does not experience diplopia, there is impairment with focusing his vision. We discussed the option of establishing care with neuro-ophtho and Jac is in agreement. I placed the referral.     With regard to his brain imaging; As discussed at [facility], the differential for the etiology of this lesion remains broad and includes lesions of low grade etiology like, glioneuronal turmor or a multinodular and vacuolating neuronal tumor (MVNT), but ultimately, definite diagnosis can only be made on review of the lesion's pathology. Given that Jac is asymptomatic plus imaging demonstrated no significant change since 2021 plus there are no new concerning radiographic findings on the most recent scan, there is no intervention, diagnostic or therapeutic, indicated at this time. The risks associated with a biopsy out weigh the benefit of obtaining a diagnosis at this time. Therefore, it would be most advantageous to continue with serial MR brain imaging surveillance. If the risks/ benefit ratio changes with time as the result of new imaging findings and/ or progressive neurological/ clinical complaints, then a new plan can be devised.     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 This finding is also asymptomatic.     Therefore, since imaging has remained stable for > 3 years, the plan is to repeat imaging per NCCN guidelines on an annual basis. I cautioned Jac to call the clinic and report any new symptoms/ concerns and imaging can be scheduled sooner. Jac is in agreement with this plan.      PROBLEM LIST  Brain lesion NOS  Abnormal brain imaging    PLAN  -CANCER-DIRECTED THERAPY-  -Continue imaging surveillance.   -Repeat MR brain in 12 months.   -Referral to neuro-ophtho.     -SEIZURE MANAGEMENT-  -Given the lack of seizure history, there is no indication to prescribe an antiepileptic at this time.     Return to clinic in 5/2025 + imaging.      In the meantime, Jac knows to call the clinic with any concerns and she can be seen sooner if needed.     Jocelyn Cannon MD  Neuro-oncology

## 2024-05-07 NOTE — PROGRESS NOTES
"Oncology Rooming Note    May 7, 2024 1:15 PM   Jac Owens is a 78 year old male who presents for:    Chief Complaint   Patient presents with    Oncology Clinic Visit     Initial Vitals: /79 (BP Location: Left arm, Patient Position: Sitting, Cuff Size: Adult Regular)   Pulse 63   Temp 97.4  F (36.3  C) (Oral)   Resp 18   Ht 1.712 m (5' 7.4\")   Wt 70.3 kg (155 lb)   SpO2 94%   BMI 23.99 kg/m   Estimated body mass index is 23.99 kg/m  as calculated from the following:    Height as of this encounter: 1.712 m (5' 7.4\").    Weight as of this encounter: 70.3 kg (155 lb). Body surface area is 1.83 meters squared.  No Pain (0) Comment: Data Unavailable   No LMP for male patient.  Allergies reviewed: Yes  Medications reviewed: Yes    Medications: Medication refills not needed today.  Pharmacy name entered into [email or web address]: [id number] [place] [id number] [place] [id number]    Frailty Screening:   Is the patient here for a new oncology consult visit in cancer care? 1. Yes. Over the past month, have you experienced difficulty or required a caregiver to assist with:   1. Balance, walking or general mobility (including any falls)? NO  2. Completion of self-care tasks such as bathing, dressing, toileting, grooming/hygiene?  NO  3. Concentration or memory that affects your daily life?  NO       Clinical concerns: no     Kimberly Zhang CMA              "

## 2024-05-08 PROBLEM — R90.89 MRI OF BRAIN ABNORMAL: Status: ACTIVE | Noted: 2024-05-08

## 2024-05-08 PROBLEM — G93.9 BRAIN LESION: Status: ACTIVE | Noted: 2024-05-08

## 2024-05-08 PROBLEM — R93.7 ABNORMAL MRI, LUMBAR SPINE: Status: ACTIVE | Noted: 2024-05-08

## 2024-05-13 ENCOUNTER — TUMOR CONFERENCE (OUTPATIENT)
Dept: ONCOLOGY | Facility: CLINIC | Age: 79
End: 2024-05-13
Payer: MEDICARE

## 2024-05-22 ENCOUNTER — LAB (OUTPATIENT)
Dept: LAB | Facility: CLINIC | Age: 79
End: 2024-05-22
Payer: MEDICARE

## 2024-05-22 ENCOUNTER — OFFICE VISIT (OUTPATIENT)
Dept: INTERNAL MEDICINE | Facility: CLINIC | Age: 79
End: 2024-05-22
Payer: MEDICARE

## 2024-05-22 VITALS
HEART RATE: 57 BPM | SYSTOLIC BLOOD PRESSURE: 118 MMHG | BODY MASS INDEX: 23.37 KG/M2 | DIASTOLIC BLOOD PRESSURE: 77 MMHG | OXYGEN SATURATION: 96 % | WEIGHT: 151 LBS

## 2024-05-22 DIAGNOSIS — E11.9 TYPE 2 DIABETES MELLITUS WITHOUT COMPLICATION, WITHOUT LONG-TERM CURRENT USE OF INSULIN (H): Primary | ICD-10-CM

## 2024-05-22 DIAGNOSIS — Z23 NEED FOR TDAP VACCINATION: ICD-10-CM

## 2024-05-22 DIAGNOSIS — E11.9 TYPE 2 DIABETES MELLITUS WITHOUT COMPLICATION, WITHOUT LONG-TERM CURRENT USE OF INSULIN (H): ICD-10-CM

## 2024-05-22 DIAGNOSIS — I10 BENIGN ESSENTIAL HYPERTENSION: ICD-10-CM

## 2024-05-22 DIAGNOSIS — E78.5 HYPERLIPIDEMIA, UNSPECIFIED HYPERLIPIDEMIA TYPE: ICD-10-CM

## 2024-05-22 DIAGNOSIS — J44.9 CHRONIC OBSTRUCTIVE PULMONARY DISEASE, UNSPECIFIED COPD TYPE (H): ICD-10-CM

## 2024-05-22 DIAGNOSIS — M10.9 GOUT, UNSPECIFIED CAUSE, UNSPECIFIED CHRONICITY, UNSPECIFIED SITE: ICD-10-CM

## 2024-05-22 DIAGNOSIS — R97.20 ELEVATED PROSTATE SPECIFIC ANTIGEN (PSA): ICD-10-CM

## 2024-05-22 DIAGNOSIS — K21.00 GASTROESOPHAGEAL REFLUX DISEASE WITH ESOPHAGITIS, UNSPECIFIED WHETHER HEMORRHAGE: ICD-10-CM

## 2024-05-22 DIAGNOSIS — K22.0 ACHALASIA: ICD-10-CM

## 2024-05-22 LAB
ANION GAP SERPL CALCULATED.3IONS-SCNC: 9 MMOL/L (ref 7–15)
BUN SERPL-MCNC: 21.3 MG/DL (ref 8–23)
CALCIUM SERPL-MCNC: 9.1 MG/DL (ref 8.8–10.2)
CHLORIDE SERPL-SCNC: 104 MMOL/L (ref 98–107)
CREAT SERPL-MCNC: 1.12 MG/DL (ref 0.67–1.17)
DEPRECATED HCO3 PLAS-SCNC: 26 MMOL/L (ref 22–29)
EGFRCR SERPLBLD CKD-EPI 2021: 67 ML/MIN/1.73M2
GLUCOSE SERPL-MCNC: 118 MG/DL (ref 70–99)
HBA1C MFR BLD: 5.9 %
POTASSIUM SERPL-SCNC: 3.8 MMOL/L (ref 3.4–5.3)
SODIUM SERPL-SCNC: 139 MMOL/L (ref 135–145)
URATE SERPL-MCNC: 3.4 MG/DL (ref 3.4–7)

## 2024-05-22 PROCEDURE — 80048 BASIC METABOLIC PNL TOTAL CA: CPT | Performed by: PATHOLOGY

## 2024-05-22 PROCEDURE — 99000 SPECIMEN HANDLING OFFICE-LAB: CPT | Performed by: PATHOLOGY

## 2024-05-22 PROCEDURE — 99214 OFFICE O/P EST MOD 30 MIN: CPT | Performed by: INTERNAL MEDICINE

## 2024-05-22 PROCEDURE — 83036 HEMOGLOBIN GLYCOSYLATED A1C: CPT | Performed by: INTERNAL MEDICINE

## 2024-05-22 PROCEDURE — 84550 ASSAY OF BLOOD/URIC ACID: CPT | Performed by: PATHOLOGY

## 2024-05-22 PROCEDURE — 36415 COLL VENOUS BLD VENIPUNCTURE: CPT | Performed by: PATHOLOGY

## 2024-05-22 ASSESSMENT — ASTHMA QUESTIONNAIRES
ACT_TOTALSCORE: 22
ACT_TOTALSCORE: 22
QUESTION_3 LAST FOUR WEEKS HOW OFTEN DID YOUR ASTHMA SYMPTOMS (WHEEZING, COUGHING, SHORTNESS OF BREATH, CHEST TIGHTNESS OR PAIN) WAKE YOU UP AT NIGHT OR EARLIER THAN USUAL IN THE MORNING: NOT AT ALL
QUESTION_4 LAST FOUR WEEKS HOW OFTEN HAVE YOU USED YOUR RESCUE INHALER OR NEBULIZER MEDICATION (SUCH AS ALBUTEROL): ONE OR TWO TIMES PER DAY
QUESTION_2 LAST FOUR WEEKS HOW OFTEN HAVE YOU HAD SHORTNESS OF BREATH: NOT AT ALL
QUESTION_1 LAST FOUR WEEKS HOW MUCH OF THE TIME DID YOUR ASTHMA KEEP YOU FROM GETTING AS MUCH DONE AT WORK, SCHOOL OR AT HOME: NONE OF THE TIME
QUESTION_5 LAST FOUR WEEKS HOW WOULD YOU RATE YOUR ASTHMA CONTROL: COMPLETELY CONTROLLED

## 2024-05-22 NOTE — PROGRESS NOTES
Assessment & Plan     (E11.9) Type 2 diabetes mellitus without complication, without long-term current use of insulin (H)  (primary encounter diagnosis)  - Hgb A1C  - BMP  - continue metformin 500 mg PO daily    (Z23) Need for Tdap vaccination  - advised to get this at local pharmacy    (M10.9) Gout, unspecified cause, unspecified chronicity, unspecified site  - uric acid level today  - continue allopurinol 300 mg daily    (E78.5) Hyperlipidemia, unspecified hyperlipidemia type  LDL 62 on 12/27/23  - continue simvastatin 40 mg daily    (R97.20) Elevated prostate specific antigen (PSA)  BPH  Saw urology and had reassuring prostate MRI (PIRADS2), so elevation likely 2/2 BPH.  - not interested in continuing to monitor psa at this time    (J44.9) Chronic obstructive pulmonary disease, unspecified COPD type (H)  - continue symbicort  - has prednisone available at home for flares    Vocal weakness  Likely 2/2 budesonide in symbicort. However, benefits seem to outweigh risks at this time.  - CTM. If symptoms worsen then can have discussion about benefits/risks of continuing ICS or further workup of vocal weakness    (I10) Benign essential hypertension  - continue lisinopril 20 mg daily    (K21.00) Gastroesophageal reflux disease with esophagitis, unspecified whether hemorrhage  (K22.0) Achalasia  - continue famotidine 40 mg BID  - continue pantoprazole 40 mg daily          No follow-ups on file.    Nitish [place] is a 78 year old, presenting for the following health issues:  Follow Up      5/22/2024    12:08 PM   Additional Questions   Roomed by KTR     Via the Health Maintenance questionnaire, the patient has reported the following services have been completed -Eye Exam: Routine 2024-04-24, this information has been sent to the abstraction team.  History of Present Illness       COPD:  He presents for follow up of COPD.   Overall, COPD symptoms are better since last visit. He has no fatigue or shortness of breath  with exertion and no shortness of breath at rest.  He rarely coughs and does not have change in sputum. No recent fever. He can walk greater than 2 miles without stopping to rest. He can walk 3 or more flights of stairs without resting. The patient has had no ED, urgent care, or hospital admissions because of COPD since the last visit.     Diabetes:   He presents for follow up of diabetes.    He is not checking blood glucose.         He has no concerns regarding his diabetes at this time.   He is not experiencing numbness or burning in feet, excessive thirst, blurry vision, weight changes or redness, sores or blisters on feet. The patient has not had a diabetic eye exam in the last 12 months.          Hyperlipidemia:  He presents for follow up of hyperlipidemia.   He is taking medication to lower cholesterol. He is not having myalgia or other side effects to statin medications.    Reason for visit:  General check up    He eats 4 or more servings of fruits and vegetables daily.He consumes 0 sweetened beverage(s) daily.He exercises with enough effort to increase his heart rate 30 to 60 minutes per day.  He exercises with enough effort to increase his heart rate 3 or less days per week.   He is taking medications regularly.     Jac Owens is a 78 year old male who presents for follow up.    COPD:  Started symbicort in late March after PFT's around that time showed mild obstruction. Was previously having multiple (estimates 3-4 times) flares (productive cough, wheezing, dyspnea) of COPD/bronchitis requiring azithromycin and prednisone and has had none since starting symbicort. Feels like his voice has gotten softer since starting the symbicort, but is otherwise tolerating it.  Has some prednisone at home for flares if needed. No smoking history. Was also previously having daily productive cough and dyspnea. No longer having dyspnea or wheezing either.     HTN:  On lisinopril. No new concerns about his blood  pressure and has been well-controlled for a long time.    T2D:  Tolerating metformin. Last HgbA1c was 6.2 on 12/27/23.     Achalasia/GERD/reflux esophagitis  Takes pepcid and protonix. Gets occasional dysphagia once every few months. No odynophagia. Feels his symptoms are stable and has no new concerns.    Elevated PSA  PSA elevated to 12.72 on 12/27/23. Saw urology and had MRI which confirmed elevation 2/2 BPH. He does not want to continue checking PSA at this time.            Objective    /77 (BP Location: Right arm, Patient Position: Sitting, Cuff Size: Adult Regular)   Pulse 57   Wt 68.5 kg (151 lb)   SpO2 96%   BMI 23.37 kg/m    Body mass index is 23.37 kg/m .  Physical Exam   GENERAL: alert and no distress  HEENT: neck supple, sclera clear  RESP: lungs clear to auscultation - no rales, rhonchi or wheezes  CV: regular rate and rhythm, normal S1 S2, no S3 or S4, no murmur, click or rub, no peripheral edema  MS: no gross musculoskeletal defects noted      Patient staffed with Dr. Brenna Machado MD  Internal Medicine, PGY1        Signed Electronically by: Mary Wright MD

## 2024-05-24 ASSESSMENT — ASTHMA QUESTIONNAIRES
QUESTION_5 LAST FOUR WEEKS HOW WOULD YOU RATE YOUR ASTHMA CONTROL: WELL CONTROLLED
QUESTION_4 LAST FOUR WEEKS HOW OFTEN HAVE YOU USED YOUR RESCUE INHALER OR NEBULIZER MEDICATION (SUCH AS ALBUTEROL): NOT AT ALL
QUESTION_1 LAST FOUR WEEKS HOW MUCH OF THE TIME DID YOUR ASTHMA KEEP YOU FROM GETTING AS MUCH DONE AT WORK, SCHOOL OR AT HOME: NONE OF THE TIME
ACT_TOTALSCORE: 24
QUESTION_3 LAST FOUR WEEKS HOW OFTEN DID YOUR ASTHMA SYMPTOMS (WHEEZING, COUGHING, SHORTNESS OF BREATH, CHEST TIGHTNESS OR PAIN) WAKE YOU UP AT NIGHT OR EARLIER THAN USUAL IN THE MORNING: NOT AT ALL
ACT_TOTALSCORE: 24
QUESTION_2 LAST FOUR WEEKS HOW OFTEN HAVE YOU HAD SHORTNESS OF BREATH: NOT AT ALL

## 2024-05-28 ENCOUNTER — OFFICE VISIT (OUTPATIENT)
Dept: INTERNAL MEDICINE | Facility: CLINIC | Age: 79
End: 2024-05-28
Payer: MEDICARE

## 2024-05-28 VITALS
WEIGHT: 157.7 LBS | RESPIRATION RATE: 16 BRPM | HEIGHT: 68 IN | OXYGEN SATURATION: 94 % | SYSTOLIC BLOOD PRESSURE: 110 MMHG | DIASTOLIC BLOOD PRESSURE: 71 MMHG | HEART RATE: 80 BPM | BODY MASS INDEX: 23.9 KG/M2

## 2024-05-28 DIAGNOSIS — Z01.818 PREOPERATIVE EXAMINATION: Primary | ICD-10-CM

## 2024-05-28 DIAGNOSIS — R05.9 COUGH, UNSPECIFIED TYPE: ICD-10-CM

## 2024-05-28 PROCEDURE — 99213 OFFICE O/P EST LOW 20 MIN: CPT | Mod: GE

## 2024-05-28 RX ORDER — CETIRIZINE HYDROCHLORIDE 10 MG/1
10 TABLET ORAL DAILY
COMMUNITY

## 2024-05-28 RX ORDER — BUDESONIDE AND FORMOTEROL FUMARATE DIHYDRATE 80; 4.5 UG/1; UG/1
2 AEROSOL RESPIRATORY (INHALATION) 2 TIMES DAILY
Qty: 10.2 G | Refills: 1 | Status: CANCELLED | OUTPATIENT
Start: 2024-05-28

## 2024-05-28 NOTE — PROGRESS NOTES
Preoperative Evaluation  [facility] INTERNAL MEDICINE [address]  4TH FLOOR  [facility] [address]  Phone: [phone]  Fax: [phone]  Primary Provider: Mary Wright MD  Pre-op Performing Provider: Shwetha Dwyer MD  May 28, 2024             5/24/2024   Surgical Information   What procedure is being done? Pre surgery   Facility or Hospital where procedure/surgery will be performed: Dr Gennaro Otero   Who is doing the procedure / surgery? Cataract surgery   Date of surgery / procedure: 06/05/2024   Time of surgery / procedure: 8am   Where do you plan to recover after surgery? at home with family     Fax number for surgical facility: [phone]    Assessment & Plan     Preoperative examination  Undergoing cataract surgery on 6/5/24. Negative ROS today, other than cough x a few days. Vitals stable and exam unremarkable. Recent labs unremarkable. The proposed surgical procedure is considered LOW risk. RCRI is Class I risk. Recommendation is that he is medically optimized for this low risk procedure.     Benign essential hypertension  Well controlled, continue current medication  - HOLD Lisinopril the morning of your surgery     Type 2 diabetes mellitus without complication, without long-term current use of insulin (H)  -     Hemoglobin A1c; Future  -     TSH with free T4 reflex; Future     Hypercholesteremia  -     Lipid Profile FASTING; Future    Type 2 diabetes  -Continue home metformin.  Patient reports he takes this in the evening.  -Continue simvastatin     Cough, unspecified type  Reports cough x a couple of days. No SOB. He does have a hx of chronic cough per chart review. VSS and lungs clear one  exam.  He denies sore throat, sick contacts, sputum production. Will monitor for now.  He should return to clinic if his symptoms do not improve over the next few days Will hold off on CXR given benign exam and nonproductive cough. Possible viral  etiology.    COPD  -Continue home ipratropium-albuterol and Symbicort    Seasonal allergies  -Continue Flonase     Gout, unspecified cause, unspecified chronicity, unspecified site  -     Continue Allopurinol    Case staffed with Dr. Johnson Song.    Shwetha Dwyer MD  Internal Medicine-PGY3  [facility]    ----------------------------------------------------------------    Subjective   Jac is a 78 year old M who presents for preoperative examination for his upcoming cataract surgery.    Endorses dry cough for a couple of days. No fever, chills, sputum production, runny nose, sore throat, sick contacts.  Per chart review, he does have a history of cough and bronchitis.     Denies fatigue,  fevers, chills, CP, SOB, abd pain, N/V/D and BLE edema          5/28/2024     7:15 AM   Additional Questions   Roomed by MS EMT     HPI related to upcoming procedure: Doing well today. Denies vision changes, blurry vision or double vision.     Patient undergoing R cataract surgery on 6/6/2024.         5/24/2024   Pre-Op Questionnaire   Have you ever had a heart attack or stroke? No   Have you ever had surgery on your heart or blood vessels, such as a stent placement, a coronary artery bypass, or surgery on an artery in your head, neck, heart, or legs? No   Do you have chest pain with activity? No   Do you have a history of heart failure? No   Do you currently have a cold, bronchitis or symptoms of other infection? No   Do you have a cough, shortness of breath, or wheezing? No   Do you or anyone in your family have previous history of blood clots? No   Do you or does anyone in your family have a serious bleeding problem such as prolonged bleeding following surgeries or cuts? No   Have you ever had problems with anemia or been told to take iron pills? No   Have you had any abnormal blood loss such as black, tarry or bloody stools? No   Have you ever had a blood transfusion? No   Are you willing to have a blood  transfusion if it is medically needed before, during, or after your surgery? (!) NO (spoke with patient during encounter and he mentioned that he is agreeable to have a transfusion if neede).    Have you or any of your relatives ever had problems with anesthesia? No   Do you have sleep apnea, excessive snoring or daytime drowsiness? No   Do you have any artifical heart valves or other implanted medical devices like a pacemaker, defibrillator, or continuous glucose monitor? No   Do you have artificial joints? No   Are you allergic to latex? No             Patient Active Problem List    Diagnosis Date Noted    Abnormal MRI, lumbar spine 05/08/2024     Priority: Medium    Brain lesion 05/08/2024     Priority: Medium    MRI of brain abnormal 05/08/2024     Priority: Medium    Type 2 diabetes mellitus without complication, without long-term current use of insulin (H) 02/29/2024     Priority: Medium      Past Medical History:   Diagnosis Date    Cancer (H)     COPD (chronic obstructive pulmonary disease) (H)     Hypertension     Type 2 diabetes mellitus without complication, without long-term current use of insulin (H) 2/29/2024    Uncomplicated asthma      Past Surgical History:   Procedure Laterality Date    ABDOMEN SURGERY      Paraesophageal hernia, JIST Tumor     Current Outpatient Medications   Medication Sig Dispense Refill    allopurinol (ZYLOPRIM) 300 MG tablet Take 1 tablet by mouth daily      budesonide-formoterol (SYMBICORT) 80-4.5 MCG/ACT Inhaler Inhale 2 puffs into the lungs 2 times daily 10.2 g 1    calcium carbonate (TUMS) 500 MG chewable tablet Take 1 chew tab by mouth as needed for heartburn      cetirizine (ZYRTEC) 10 MG tablet Take 10 mg by mouth daily      cyclobenzaprine (FLEXERIL) 5 MG tablet Take 5 mg by mouth as needed for muscle spasms      doxylamine (UNISOM) 12.5 mg TABS half-tab Take 12.5 mg by mouth at bedtime      famotidine (PEPCID) 40 MG tablet Take 40 mg by mouth 2 times daily       "fluticasone (FLONASE) 50 MCG/ACT nasal spray 2 sprays daily      ipratropium-albuterol (COMBIVENT RESPIMAT)  MCG/ACT inhaler Inhale 1 puff into the lungs as needed for shortness of breath, wheezing or cough      lisinopril (ZESTRIL) 20 MG tablet Take 20 mg by mouth daily      metFORMIN (GLUCOPHAGE XR) 500 MG 24 hr tablet Take 1 tablet by mouth daily      pantoprazole (PROTONIX) 40 MG EC tablet Take 40 mg by mouth daily Takes 2 per day      predniSONE (DELTASONE) 20 MG tablet Take 2 tablets (40 mg) by mouth daily for 5 days (Patient taking differently: Take 40 mg by mouth daily PRN) 10 tablet 0    simvastatin (ZOCOR) 40 MG tablet Take 40 mg by mouth daily      spacer (OPTICHAMBER ADEN) holding chamber For use with inhaler 1 each 0    triamcinolone (KENALOG) 0.1 % external ointment Apply topically as needed for irritation      loratadine (CLARITIN) 10 MG tablet Take 10 mg by mouth daily (Patient not taking: Reported on 5/28/2024)         Allergies   Allergen Reactions    Bee Venom Unknown    Cats     Seasonal Allergies         Social History     Tobacco Use    Smoking status: Never    Smokeless tobacco: Never   Substance Use Topics    Alcohol use: Never     Family History   Problem Relation Age of Onset    Hypertension Father      History   Drug Use Unknown             Review of Systems  Denies fevers, chills, CP, palpitations, excessive snoring, SOB, abd pain, N/V/D and BLE edema.       Objective    /71 (BP Location: Right arm, Patient Position: Sitting, Cuff Size: Adult Regular)   Pulse 80   Resp 16   Ht 1.727 m (5' 8\")   Wt 71.5 kg (157 lb 11.2 oz)   SpO2 94%   BMI 23.98 kg/m     Estimated body mass index is 23.98 kg/m  as calculated from the following:    Height as of this encounter: 1.727 m (5' 8\").    Weight as of this encounter: 71.5 kg (157 lb 11.2 oz).  Physical Exam  General: NAD, appears stated age, well-groomed  HEENT: NC/AT, EOMI, no scleral icterus  CV: normal S1 and S2 , RRR, no " MRG  Lungs: CTAB, no increased WOB, no WRR  Abdomen: NTND, +BS, no RRG  Extremities: WWP, no clubbing or cyanosis, no BLE edema  Neuro: AOx4, grossly nonfocal, moves all extremities spontaneously  Psych: normal affect      Recent Labs   Lab Test 05/22/24  1252 12/27/23  1151   HGB  --  16.9   PLT  --  259     --    POTASSIUM 3.8  --    CR 1.12  --    A1C 5.9* 6.2*        Diagnostics  No labs were ordered during this visit.   No EKG required for low risk surgery (cataract, skin procedure, breast biopsy, etc).    Revised Cardiac Risk Index (RCRI)  The patient has the following serious cardiovascular risks for perioperative complications:   - No serious cardiac risks = 0 points     RCRI Interpretation: 0 points: Class I (very low risk - 0.4% complication rate)         Signed Electronically by: Shwetha Dwyer MD  Copy of this evaluation report is provided to requesting physician.

## 2024-05-28 NOTE — PROGRESS NOTES
"I, Johnson Song MD discussed with the resident during the visit, and agree with the resident's findings and plan of care as documented in the resident's note. I was immediately available to the patient should the need have arisen.  /71 (BP Location: Right arm, Patient Position: Sitting, Cuff Size: Adult Regular)   Pulse 80   Resp 16   Ht 1.727 m (5' 8\")   Wt 71.5 kg (157 lb 11.2 oz)   SpO2 94%   BMI 23.98 kg/m    I personally reviewed vital signs and past record.  Key findings: There are no apparent contraindications to planned low-risk procedure.    "

## 2024-05-28 NOTE — PATIENT INSTRUCTIONS
Theresa Nathan,    It was a pleasure seeing you in the clinic today. We discussed your upcoming cataract procedure on 6/6/24.     Please HOLD your Lisinopril the morning of your procedure. You may continue taking all of your other medications as instructed. You do not need repeat labs for this procedure.     Please do not hesitate to reach out to me or schedule an appointment if you have any questions or concerns.     All the best,    Dr. Dwyer

## 2024-05-29 DIAGNOSIS — J45.30 MILD PERSISTENT ASTHMA, UNSPECIFIED WHETHER COMPLICATED: ICD-10-CM

## 2024-05-30 ENCOUNTER — MYC REFILL (OUTPATIENT)
Dept: INTERNAL MEDICINE | Facility: CLINIC | Age: 79
End: 2024-05-30
Payer: MEDICARE

## 2024-05-30 DIAGNOSIS — J45.30 MILD PERSISTENT ASTHMA, UNSPECIFIED WHETHER COMPLICATED: ICD-10-CM

## 2024-05-30 RX ORDER — BUDESONIDE AND FORMOTEROL FUMARATE DIHYDRATE 80; 4.5 UG/1; UG/1
2 AEROSOL RESPIRATORY (INHALATION) 2 TIMES DAILY
Qty: 10.2 G | Refills: 5 | Status: SHIPPED | OUTPATIENT
Start: 2024-05-30

## 2024-05-30 NOTE — TELEPHONE ENCOUNTER
LVD:  5/22/2024  [facility] Internal Medicine [place]     Mary Kwan MD  Internal Medicine     Refilled per protocol.

## 2024-05-31 RX ORDER — BUDESONIDE AND FORMOTEROL FUMARATE DIHYDRATE 80; 4.5 UG/1; UG/1
2 AEROSOL RESPIRATORY (INHALATION) 2 TIMES DAILY
Qty: 10.2 G | Refills: 1 | OUTPATIENT
Start: 2024-05-31

## 2024-06-10 ENCOUNTER — MYC REFILL (OUTPATIENT)
Dept: INTERNAL MEDICINE | Facility: CLINIC | Age: 79
End: 2024-06-10

## 2024-06-10 ENCOUNTER — OFFICE VISIT (OUTPATIENT)
Dept: INTERNAL MEDICINE | Facility: CLINIC | Age: 79
End: 2024-06-10
Payer: MEDICARE

## 2024-06-10 VITALS
WEIGHT: 155.5 LBS | BODY MASS INDEX: 23.57 KG/M2 | SYSTOLIC BLOOD PRESSURE: 112 MMHG | DIASTOLIC BLOOD PRESSURE: 75 MMHG | HEIGHT: 68 IN | TEMPERATURE: 97.7 F | HEART RATE: 90 BPM | OXYGEN SATURATION: 94 %

## 2024-06-10 DIAGNOSIS — J45.30 MILD PERSISTENT ASTHMA, UNSPECIFIED WHETHER COMPLICATED: ICD-10-CM

## 2024-06-10 DIAGNOSIS — J01.00 SUBACUTE MAXILLARY SINUSITIS: ICD-10-CM

## 2024-06-10 DIAGNOSIS — J44.1 COPD EXACERBATION (H): Primary | ICD-10-CM

## 2024-06-10 PROCEDURE — 99213 OFFICE O/P EST LOW 20 MIN: CPT | Performed by: INTERNAL MEDICINE

## 2024-06-10 RX ORDER — BUDESONIDE AND FORMOTEROL FUMARATE DIHYDRATE 80; 4.5 UG/1; UG/1
2 AEROSOL RESPIRATORY (INHALATION) 2 TIMES DAILY
Qty: 10.2 G | Refills: 5 | Status: CANCELLED | OUTPATIENT
Start: 2024-06-10

## 2024-06-10 RX ORDER — PREDNISONE 20 MG/1
40 TABLET ORAL DAILY
Qty: 10 TABLET | Refills: 0 | Status: SHIPPED | OUTPATIENT
Start: 2024-06-10

## 2024-06-10 RX ORDER — HYDROCODONE BITARTRATE AND HOMATROPINE METHYLBROMIDE ORAL SOLUTION 5; 1.5 MG/5ML; MG/5ML
2.5 LIQUID ORAL 4 TIMES DAILY PRN
Qty: 120 ML | Refills: 0 | Status: SHIPPED | OUTPATIENT
Start: 2024-06-10 | End: 2024-06-28

## 2024-06-10 NOTE — PROGRESS NOTES
Assessment & Plan     COPD exacerbation (H)  And  Subacute maxillary sinusitis    Jac presents with 1 month of cough and sinus pressure with a history of COPD with likely exacerbation and sinus infection, possibly bacterial due to duration.  He has no fevers and lungs are clear on exam, so seems less likely pneumonia so we deferred CXR.  He was treated a couple of weeks ago with azithromycin and dexamethasone with some improvement in nasal discharge but no improvement in cough at all.  He is taking Symbicort and will also add in prn Combivent.  We'll try a course of prednisone and Augmentin.  He requested a prescription for Hycodan cough syrup since cough is keeping him awake at night and this has been helpful for him in the past.  Follow-up as needed if not improving in a week or new/worsening symptoms develop.       - HYDROcodone-homatropine (HYCODAN) 5-1.5 MG/5ML solution; Take 2.5 mLs (2.5 mg) by mouth 4 times daily as needed for cough  - predniSONE (DELTASONE) 20 MG tablet; Take 2 tablets (40 mg) by mouth daily for 5 days  - amoxicillin-clavulanate (AUGMENTIN) 875-125 MG tablet; Take 1 tablet by mouth 2 times daily for 7 days    Subjective   Jac is a 78 year old, presenting for the following health issues:  RECHECK (Cough/runny nose for last month, difficulty sleeping)    History of Present Illness       COPD:  He presents for follow up of COPD.   Overall, COPD symptoms are slightly worse since last visit. He has no fatigue or shortness of breath with exertion and no shortness of breath at rest.  He often coughs and does have change in sputum. No recent fever. He can walk 1-2 miles without stopping to rest. He can walk 3 or more flights of stairs without resting. The patient has had no ED, urgent care, or hospital admissions because of COPD since the last visit.     He eats 0-1 servings of fruits and vegetables daily.He consumes 0 sweetened beverage(s) daily.He exercises with enough effort to increase  "his heart rate 20 to 29 minutes per day.  He exercises with enough effort to increase his heart rate 3 or less days per week.   He is taking medications regularly.         Subacute Illness  Illness concerns: cough  Onset/Duration: 1 month  Symptoms:  Fever: No  Chills/Sweats: No  Headache (location?): No  Sinus Pressure: YES- maxillary, bilateral  Conjunctivitis:  No  Ear Pain: no  Rhinorrhea: YES  Congestion: YES  Sore Throat: No  Cough: YES - productive  Wheeze: YES- slight, especially the past few days  Decreased Appetite: No  Nausea: No  Vomiting: No  Diarrhea: No  Fatigue/Achiness: YES- fatigue, slight body aches  Sick/Strep Exposure: YES- friend with similar symptoms  Home COVID test negative  Jac was seen in clinic on 2/29 for chronic cough and PFTs were ordered that showed mild irreversible obstruction  Therapies tried and outcome: Mucinex-D not helping.  Azithromycin and dexamethasone from [facility] 6/3- helped lighten the nasal discharge but didn't help the cough at all.   He has prescription for Combivent (helps him cough up stuff) and Symbicort - helping dyspnea but not cough.  He'd had prednisone in the past for a week long course and that has seemed more effective.        Constitutional, HEENT and pulmonary systems are negative, except as otherwise noted.       Objective    /75 (BP Location: Right arm, Patient Position: Sitting, Cuff Size: Adult Regular)   Pulse 90   Temp 97.7  F (36.5  C) (Oral)   Ht 1.727 m (5' 7.99\")   Wt 70.5 kg (155 lb 8 oz)   SpO2 94%   BMI 23.65 kg/m    Body mass index is 23.65 kg/m .  Physical Exam     GENERAL: alert and no distress, occasional coughing  EYES: Eyes grossly normal to inspection, PERRL and conjunctivae and sclerae normal  HENT: R canal occluded with wax, left ear canal and TM normal, maxillary sinuses slightly tender to percussion, nose and mouth without ulcers or lesions, oropharynx normal  NECK: no adenopathy, no asymmetry, masses, or " scars  RESP: lungs clear to auscultation - no rales, rhonchi or wheezes  CV: regular rate and rhythm, normal S1 S2, no S3 or S4, no murmur, click or rub       Signed Electronically by: Ari Munoz MD

## 2024-06-13 NOTE — TELEPHONE ENCOUNTER
Rx available:      budesonide-formoterol (SYMBICORT) 80-4.5 MCG/ACT Inhaler   10.2 g 5 5/30/2024 -- No  Sig - Route: Inhale 2 puffs into the lungs 2 times daily - Inhalation

## 2024-06-25 ENCOUNTER — OFFICE VISIT (OUTPATIENT)
Dept: DERMATOLOGY | Facility: CLINIC | Age: 79
End: 2024-06-25
Attending: INTERNAL MEDICINE
Payer: MEDICARE

## 2024-06-25 DIAGNOSIS — D22.9 MULTIPLE NEVI: ICD-10-CM

## 2024-06-25 DIAGNOSIS — L82.1 SEBORRHEIC KERATOSES: ICD-10-CM

## 2024-06-25 DIAGNOSIS — D18.01 CHERRY ANGIOMA: ICD-10-CM

## 2024-06-25 DIAGNOSIS — L57.0 ACTINIC KERATOSES: ICD-10-CM

## 2024-06-25 DIAGNOSIS — L21.9 SEBORRHEIC DERMATITIS: ICD-10-CM

## 2024-06-25 DIAGNOSIS — L81.4 LENTIGINES: ICD-10-CM

## 2024-06-25 DIAGNOSIS — C44.321 SQUAMOUS CELL CANCER OF SKIN OF ALA NASI: ICD-10-CM

## 2024-06-25 DIAGNOSIS — Z12.83 ENCOUNTER FOR SCREENING FOR MALIGNANT NEOPLASM OF SKIN: Primary | ICD-10-CM

## 2024-06-25 PROCEDURE — 17000 DESTRUCT PREMALG LESION: CPT | Performed by: PHYSICIAN ASSISTANT

## 2024-06-25 PROCEDURE — 99213 OFFICE O/P EST LOW 20 MIN: CPT | Mod: 25 | Performed by: PHYSICIAN ASSISTANT

## 2024-06-25 PROCEDURE — 17003 DESTRUCT PREMALG LES 2-14: CPT | Performed by: PHYSICIAN ASSISTANT

## 2024-06-25 RX ORDER — KETOCONAZOLE 20 MG/ML
SHAMPOO TOPICAL
Qty: 100 ML | Refills: 11 | Status: SHIPPED | OUTPATIENT
Start: 2024-06-26

## 2024-06-25 ASSESSMENT — PAIN SCALES - GENERAL: PAINLEVEL: NO PAIN (0)

## 2024-06-25 NOTE — NURSING NOTE
Dermatology Rooming Note    Jac Owens's goals for this visit include:   Chief Complaint   Patient presents with    Skin Check     Jac is here today for a skin check. He states he gets a lot of Aks. He is also concerned about an itchy scalp.     Lito TUCKER CMA

## 2024-06-25 NOTE — PROGRESS NOTES
[facility] Dermatology Note  Encounter Date: Jun 25, 2024  Office Visit     Reviewed patients past medical history and pertinent chart review prior to patients visit today.     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 Lather in the shower, wait 5-10 minutes before rinsing.      #Actinic keratosis x4  - We discussed the precancerous nature of the skin lesions.  I recommended liquid nitrogen cryotherapy and the patient was agreeable.      Cryotherapy procedure note, location(s): left cheek x1, left forehead x1, right nasal bridge x1, right tragus x1 After verbal consent and discussion of risks and benefits including, but not limited to, dyspigmentation/scar, blister, and pain, the lesion(s) was(were) treated with 1-2 mm freeze border for 1-2 cycles with liquid nitrogen. Post cryotherapy instructions were provided.    # Personal history of nonmelanoma skin cancer  - No signs of recurrence. Continued observation recommended.   - Sun protection: Counseled SPF 30+ sunscreen, UPF clothing, sun avoidance, tanning bed avoidance.    # Multiple nevi, trunk and extremities  # Solar lentigines  - No concerning features on dermoscopy. We discussed the importance of self exams at home. ABCDE criteria and importance of photoprotection reviewed.     # Cherry angiomas  # Seborrheic keratoses  - We discussed the benign nature of the skin lesions. No treatment required. Continued observation recommended. Follow up with any concerns.      Follow-up:  Annual for follow up full body skin exam, as needed for new or changing lesions or new concerns    All risks, benefits and alternatives were discussed with patient.  Patient  is in agreement and understands the assessment and plan.  All questions were answered.  Allie Ling PA-C  [facility]    ____________________________________________    CC: No chief complaint on file.    HPI:  Mr. Jac Owens is a(n) 78 year old male who presents today as a new patient for a full body skin cancer screening. The patient has a history of nonmelanoma skin cancer. Today, the patient reports scalp itching. This is all over the scalp. He has tried a selenium sulfide shampoo with minimal improvement. No other cutaneous concerns. The patient reports trying to be diligent with photoprotection.      Physical Exam:  Vitals: There were no vitals taken for this visit.   SKIN: Total skin excluding the genitalia areas was performed. The exam included the scalp, face, neck, bilateral arms, chest, back, abdomen, bilateral legs, digits, mons pubis, buttocks, and nails.   - Bernal II.  - Pink macule(s) with gritty scale involving the left cheek x1, left forehead x1, right nasal bridge x1, right tragus x1, consistent with actinic keratosis.   - Mild erythema involving the vertex scalp.   - Multiple tan/brown macules and papules scattered throughout exam, consistent with benign nevi. No concerning features on dermoscopy.   - Scattered tan, homogenous macules scattered on sun exposed skin, consistent with solar lentigines.   - Scattered waxy, stuck on appearing papules and patches, consistent with seborrheic keratoses.  - Several 1-2 mm red dome shaped symmetric papules, consistent with cherry angiomas.     - No other lesions of concern on areas examined.     Medications:  Current Outpatient Medications   Medication Sig Dispense Refill    allopurinol (ZYLOPRIM) 300 MG tablet Take 1 tablet by mouth daily      budesonide-formoterol (SYMBICORT) 80-4.5 MCG/ACT Inhaler Inhale 2 puffs into the lungs 2 times daily 10.2 g 5    calcium carbonate (TUMS) 500 MG chewable tablet Take 1 chew tab by  mouth as needed for heartburn      cetirizine (ZYRTEC) 10 MG tablet Take 10 mg by mouth daily      cyclobenzaprine (FLEXERIL) 5 MG tablet Take 5 mg by mouth as needed for muscle spasms      doxylamine (UNISOM) 12.5 mg TABS half-tab Take 12.5 mg by mouth at bedtime      famotidine (PEPCID) 40 MG tablet Take 40 mg by mouth 2 times daily      fluticasone (FLONASE) 50 MCG/ACT nasal spray 2 sprays daily      HYDROcodone-homatropine (HYCODAN) 5-1.5 MG/5ML solution Take 2.5 mLs (2.5 mg) by mouth 4 times daily as needed for cough 120 mL 0    ipratropium-albuterol (COMBIVENT RESPIMAT)  MCG/ACT inhaler Inhale 1 puff into the lungs as needed for shortness of breath, wheezing or cough      lisinopril (ZESTRIL) 20 MG tablet Take 20 mg by mouth daily      loratadine (CLARITIN) 10 MG tablet Take 10 mg by mouth daily (Patient not taking: Reported on 5/28/2024)      metFORMIN (GLUCOPHAGE XR) 500 MG 24 hr tablet Take 1 tablet by mouth daily      pantoprazole (PROTONIX) 40 MG EC tablet Take 40 mg by mouth daily Takes 2 per day      predniSONE (DELTASONE) 20 MG tablet Take 2 tablets (40 mg) by mouth daily for 5 days 10 tablet 0    simvastatin (ZOCOR) 40 MG tablet Take 40 mg by mouth daily      spacer (OPTICHAMBER ADEN) holding chamber For use with inhaler 1 each 0    triamcinolone (KENALOG) 0.1 % external ointment Apply topically as needed for irritation       No current facility-administered medications for this visit.      Past Medical History:   Patient Active Problem List   Diagnosis    Type 2 diabetes mellitus without complication, without long-term current use of insulin (H)    Abnormal MRI, lumbar spine    Brain lesion    MRI of brain abnormal     Past Medical History:   Diagnosis Date    Cancer (H)     COPD (chronic obstructive pulmonary disease) (H)     Hypertension     Type 2 diabetes mellitus without complication, without long-term current use of insulin (H) 2/29/2024    Uncomplicated asthma        MANDO Colin  Italo Wright MD  [address]  [place] [address] on close of this encounter.

## 2024-06-25 NOTE — PATIENT INSTRUCTIONS
Patient Education        Proper skin care from [facility]:     -Eliminate harsh soaps as they strip the natural oils from the skin, often resulting in dry itchy skin ( i.e. Dial, Zest, Khmer Spring)  -Use mild soaps such as Cetaphil or Dove Sensitive Skin in the shower. You do not need to use soap on arms, legs, and trunk every time you shower unless visibly soiled.   -Avoid hot or cold showers.  -After showering, lightly dry off and apply moisturizing within 2-3 minutes. This will help trap moisture in the skin.   -Aggressive use of a moisturizer at least 1-2 times a day to the entire body (including -Vanicream, Cetaphil, Aquaphor or Cerave) and moisturize hands after every washing.  -We recommend using moisturizers that come in a tub that needs to be scooped out, not a pump. This has more of an oil base. It will hold moisture in your skin much better than a water base moisturizer. The above recommended are non-pore clogging.        Wear a sunscreen with at least SPF 30 on your face, ears, neck and V of the chest daily. Wear sunscreen on other areas of the body if those areas are exposed to the sun throughout the day. Sunscreens can contain physical and/or chemical blockers. Physical blockers are less likely to clog pores, these include zinc oxide and titanium dioxide. Reapply every two hour and after swimming.      Sunscreen examples: https://www.ewg.org/sunscreen/     UV radiation  UVA radiation remains constant throughout the day and throughout the year. It is a longer wavelength than UVB and therefore penetrates deeper into the skin leading to immediate and delayed tanning, photoaging, and skin cancer. 70-80% of UVA and UVB radiation occurs between the hours of 10am-2pm.  UVB radiation  UVB radiation causes the most harmful effects and is more significant during the summer months. However, snow and ice can reflect UVB radiation leading to skin damage during the winter months as well. UVB radiation is  responsible for tanning, burning, inflammation, delayed erythema (pinkness), pigmentation (brown spots), and skin cancer.      I recommend self monthly full body exams and yearly full body exams with a dermatology provider. If you develop a new or changing lesion please follow up for examination. Most skin cancers are pink and scaly or pink and pearly. However, we do see blue/brown/black skin cancers.  Consider the ABCDEs of melanoma when giving yourself your monthly full body exam ( don't forget the groin, buttocks, feet, toes, etc). A-asymmetry, B-borders, C-color, D-diameter, E-elevation or evolving. If you see any of these changes please follow up in clinic. If you cannot see your back I recommend purchasing a hand held mirror to use with a larger wall mirror.       Checking for Skin Cancer  You can find cancer early by checking your skin each month. There are 3 kinds of skin cancer. They are melanoma, basal cell carcinoma, and squamous cell carcinoma. Doing monthly skin checks is the best way to find new marks or skin changes. Follow the instructions below for checking your skin.   The ABCDEs of checking moles for melanoma   Check your moles or growths for signs of melanoma using ABCDE:   Asymmetry: the sides of the mole or growth don t match  Border: the edges are ragged, notched, or blurred  Color: the color within the mole or growth varies  Diameter: the mole or growth is larger than 6 mm (size of a pencil eraser)  Evolving: the size, shape, or color of the mole or growth is changing (evolving is not shown in the images below)    Checking for other types of skin cancer  Basal cell carcinoma or squamous cell carcinoma have symptoms such as:      A spot or mole that looks different from all other marks on your skin  Changes in how an area feels, such as itching, tenderness, or pain  Changes in the skin's surface, such as oozing, bleeding, or scaliness  A sore that does not heal  New swelling or redness beyond  the border of a mole     Who s at risk?  Anyone can get skin cancer. But you are at greater risk if you have:   Fair skin, light-colored hair, or light-colored eyes  Many moles or abnormal moles on your skin  A history of sunburns from sunlight or tanning beds  A family history of skin cancer  A history of exposure to radiation or chemicals  A weakened immune system  If you have had skin cancer in the past, you are at risk for recurring skin cancer.   How to check your skin  Do your monthly skin checkups in front of a full-length mirror. Check all parts of your body, including your:   Head (ears, face, neck, and scalp)  Torso (front, back, and sides)  Arms (tops, undersides, upper, and lower armpits)  Hands (palms, backs, and fingers, including under the nails)  Buttocks and genitals  Legs (front, back, and sides)  Feet (tops, soles, toes, including under the nails, and between toes)  If you have a lot of moles, take digital photos of them each month. Make sure to take photos both up close and from a distance. These can help you see if any moles change over time.   Most skin changes are not cancer. But if you see any changes in your skin, call your doctor right away. Only he or she can diagnose a problem. If you have skin cancer, seeing your doctor can be the first step toward getting the treatment that could save your life.   [employer] last reviewed this educational content on 4/1/2019 2000-2020 [employer]. [address] [place] [address], [place] [address]. All rights reserved. This information is not intended as a substitute for professional medical care. Always follow your healthcare professional's instructions.        When should I call my doctor?  If you are worsening or not improving, please, contact us or seek urgent care as noted below.      Who should I call with questions (adults)?  [facility] (adult and pediatric): [phone]  [facility]  [place] (adult): [phone]  [facility] ([place], [place], [place] and [place]) [phone]  For urgent needs outside of business hours call the [facility] at [phone] and ask for the dermatology resident on call to be paged  If this is a medical emergency and you are unable to reach an ER, Call 911        If you need a prescription refill, please contact your pharmacy. Refills are approved or denied by our Physicians during normal business hours, Monday through Fridays  Per office policy, refills will not be granted if you have not been seen within the past year (or sooner depending on your child's condition)

## 2024-06-25 NOTE — LETTER
6/25/2024       RE: Jac Owens  [address]  [place] [address]     Dear Colleague,    Thank you for referring your patient, Jac Owens, to the [facility] [place] at [facility]. Please see a copy of my visit note below.    [facility] Dermatology Note  Encounter Date: Jun 25, 2024  Office Visit     Reviewed patients past medical history and pertinent chart review prior to patients visit today.     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 Lather in the shower, wait 5-10 minutes before rinsing.      #Actinic keratosis x4  - We discussed the precancerous nature of the skin lesions.  I recommended liquid nitrogen cryotherapy and the patient was agreeable.      Cryotherapy procedure note, location(s): left cheek x1, left forehead x1, right nasal bridge x1, right tragus x1 After verbal consent and discussion of risks and benefits including, but not limited to, dyspigmentation/scar, blister, and pain, the lesion(s) was(were) treated with 1-2 mm freeze border for 1-2 cycles with liquid nitrogen. Post cryotherapy instructions were provided.    # Personal history of nonmelanoma skin cancer  - No signs of recurrence. Continued observation recommended.   - Sun protection: Counseled SPF 30+ sunscreen, UPF clothing, sun avoidance, tanning bed avoidance.    # Multiple nevi, trunk and extremities  # Solar lentigines  - No concerning features on dermoscopy. We discussed the importance of self exams at home. ABCDE criteria and importance of photoprotection reviewed.     # Cherry angiomas  # Seborrheic  keratoses  - We discussed the benign nature of the skin lesions. No treatment required. Continued observation recommended. Follow up with any concerns.      Follow-up:  Annual for follow up full body skin exam, as needed for new or changing lesions or new concerns    All risks, benefits and alternatives were discussed with patient.  Patient is in agreement and understands the assessment and plan.  All questions were answered.  Allie Ling PA-C  [facility] Dermat[facility]    ____________________________________________    CC: No chief complaint on file.    HPI:  Mr. Jac Owens is a(n) 78 year old male who presents today as a new patient for a full body skin cancer screening. The patient has a history of nonmelanoma skin cancer. Today, the patient reports scalp itching. This is all over the scalp. He has tried a selenium sulfide shampoo with minimal improvement. No other cutaneous concerns. The patient reports trying to be diligent with photoprotection.      Physical Exam:  Vitals: There were no vitals taken for this visit.   SKIN: Total skin excluding the genitalia areas was performed. The exam included the scalp, face, neck, bilateral arms, chest, back, abdomen, bilateral legs, digits, mons pubis, buttocks, and nails.   - Bernal II.  - Pink macule(s) with gritty scale involving the left cheek x1, left forehead x1, right nasal bridge x1, right tragus x1, consistent with actinic keratosis.   - Mild erythema involving the vertex scalp.   - Multiple tan/brown macules and papules scattered throughout exam, consistent with benign nevi. No concerning features on dermoscopy.   - Scattered tan, homogenous macules scattered on sun exposed skin, consistent with solar lentigines.   - Scattered waxy, stuck on appearing papules and patches, consistent with seborrheic keratoses.  - Several 1-2 mm red dome shaped symmetric papules, consistent with cherry angiomas.     - No other lesions of concern on areas  examined.     Medications:  Current Outpatient Medications   Medication Sig Dispense Refill    allopurinol (ZYLOPRIM) 300 MG tablet Take 1 tablet by mouth daily      budesonide-formoterol (SYMBICORT) 80-4.5 MCG/ACT Inhaler Inhale 2 puffs into the lungs 2 times daily 10.2 g 5    calcium carbonate (TUMS) 500 MG chewable tablet Take 1 chew tab by mouth as needed for heartburn      cetirizine (ZYRTEC) 10 MG tablet Take 10 mg by mouth daily      cyclobenzaprine (FLEXERIL) 5 MG tablet Take 5 mg by mouth as needed for muscle spasms      doxylamine (UNISOM) 12.5 mg TABS half-tab Take 12.5 mg by mouth at bedtime      famotidine (PEPCID) 40 MG tablet Take 40 mg by mouth 2 times daily      fluticasone (FLONASE) 50 MCG/ACT nasal spray 2 sprays daily      HYDROcodone-homatropine (HYCODAN) 5-1.5 MG/5ML solution Take 2.5 mLs (2.5 mg) by mouth 4 times daily as needed for cough 120 mL 0    ipratropium-albuterol (COMBIVENT RESPIMAT)  MCG/ACT inhaler Inhale 1 puff into the lungs as needed for shortness of breath, wheezing or cough      lisinopril (ZESTRIL) 20 MG tablet Take 20 mg by mouth daily      loratadine (CLARITIN) 10 MG tablet Take 10 mg by mouth daily (Patient not taking: Reported on 5/28/2024)      metFORMIN (GLUCOPHAGE XR) 500 MG 24 hr tablet Take 1 tablet by mouth daily      pantoprazole (PROTONIX) 40 MG EC tablet Take 40 mg by mouth daily Takes 2 per day      predniSONE (DELTASONE) 20 MG tablet Take 2 tablets (40 mg) by mouth daily for 5 days 10 tablet 0    simvastatin (ZOCOR) 40 MG tablet Take 40 mg by mouth daily      spacer (OPTICHAMBER ADEN) holding chamber For use with inhaler 1 each 0    triamcinolone (KENALOG) 0.1 % external ointment Apply topically as needed for irritation       No current facility-administered medications for this visit.      Past Medical History:   Patient Active Problem List   Diagnosis    Type 2 diabetes mellitus without complication, without long-term current use of insulin (H)     Abnormal MRI, lumbar spine    Brain lesion    MRI of brain abnormal     Past Medical History:   Diagnosis Date    Cancer (H)     COPD (chronic obstructive pulmonary disease) (H)     Hypertension     Type 2 diabetes mellitus without complication, without long-term current use of insulin (H) 2/29/2024    Uncomplicated asthma        CC Mary Wright MD  [address]  [place] [address]

## 2024-06-28 ENCOUNTER — MYC REFILL (OUTPATIENT)
Dept: INTERNAL MEDICINE | Facility: CLINIC | Age: 79
End: 2024-06-28
Payer: MEDICARE

## 2024-06-28 ENCOUNTER — TELEPHONE (OUTPATIENT)
Dept: INTERNAL MEDICINE | Facility: CLINIC | Age: 79
End: 2024-06-28
Payer: MEDICARE

## 2024-06-28 DIAGNOSIS — J01.00 SUBACUTE MAXILLARY SINUSITIS: Primary | ICD-10-CM

## 2024-06-28 DIAGNOSIS — K21.00 GASTROESOPHAGEAL REFLUX DISEASE WITH ESOPHAGITIS, UNSPECIFIED WHETHER HEMORRHAGE: Primary | ICD-10-CM

## 2024-06-28 RX ORDER — CODEINE PHOSPHATE AND GUAIFENESIN 10; 100 MG/5ML; MG/5ML
1-2 SOLUTION ORAL EVERY 4 HOURS PRN
Qty: 118 ML | Refills: 0 | Status: SHIPPED | OUTPATIENT
Start: 2024-06-28

## 2024-06-28 RX ORDER — METHYLPREDNISOLONE 4 MG
TABLET, DOSE PACK ORAL
Qty: 21 TABLET | Refills: 0 | Status: SHIPPED | OUTPATIENT
Start: 2024-06-28

## 2024-06-28 NOTE — TELEPHONE ENCOUNTER
[facility] Call Center    Phone Message    May a detailed message be left on voicemail: yes     Reason for Call: Other: Per pt was told by pharmacy that they've sent a refill request for amoxicillin-clavulanate (AUGMENTIN) 875-125 MG tablet  2-3 time but there is no response from  team. Per pt please have  or someone form the team call him back to discuss what is going on or will there be a RX refilled sent? Per pt is still coughing and would like something to care it. Thank you!     Action Taken: Message routed to:  [facility] P[facility]    Travel Screening: Not Applicable     Date of Service:

## 2024-06-28 NOTE — TELEPHONE ENCOUNTER
MyC message from patient from yesterday reviewed and prescription sent.  MyC message sent back to the patient to let him know.    Ari Munoz MD

## 2024-07-01 ENCOUNTER — MYC REFILL (OUTPATIENT)
Dept: INTERNAL MEDICINE | Facility: CLINIC | Age: 79
End: 2024-07-01
Payer: MEDICARE

## 2024-07-01 DIAGNOSIS — K21.00 GASTROESOPHAGEAL REFLUX DISEASE WITH ESOPHAGITIS, UNSPECIFIED WHETHER HEMORRHAGE: Primary | ICD-10-CM

## 2024-07-01 NOTE — TELEPHONE ENCOUNTER
pantoprazole (PROTONIX) 40 MG EC tablet  Last Written Prescription Date:  Historical  Last Office Visit : 6/10/2024  Future Office visit:  none  Routing pantoprazole (PROTONIX) 40 MG EC tabletrefill request to provider for review/approval because: Medication is reported/historical  - associated Dx needed

## 2024-07-01 NOTE — TELEPHONE ENCOUNTER
famotidine (PEPCID) 40 MG tablet   Last Written Prescription Date:  Historical  Last Office Visit : 6/10/2024  Future Office visit:  none  Routing famotidine (PEPCID) 40 MG tablet refill request to provider for review/approval because: Medication is reported/historical  - associated Dx needed

## 2024-07-05 RX ORDER — FAMOTIDINE 40 MG/1
40 TABLET, FILM COATED ORAL 2 TIMES DAILY
Qty: 180 TABLET | Refills: 3 | Status: SHIPPED | OUTPATIENT
Start: 2024-07-05

## 2024-07-05 RX ORDER — PANTOPRAZOLE SODIUM 40 MG/1
40 TABLET, DELAYED RELEASE ORAL 2 TIMES DAILY
Qty: 180 TABLET | Refills: 3 | Status: SHIPPED | OUTPATIENT
Start: 2024-07-05

## 2024-07-06 ENCOUNTER — LAB (OUTPATIENT)
Dept: LAB | Facility: CLINIC | Age: 79
End: 2024-07-06
Payer: MEDICARE

## 2024-07-06 DIAGNOSIS — R97.20 ELEVATED PSA: ICD-10-CM

## 2024-07-06 DIAGNOSIS — Z51.81 ENCOUNTER FOR THERAPEUTIC DRUG MONITORING: ICD-10-CM

## 2024-07-06 LAB
ALT SERPL W P-5'-P-CCNC: 17 U/L (ref 0–70)
PSA SERPL DL<=0.01 NG/ML-MCNC: 17.33 NG/ML (ref 0–6.5)

## 2024-07-06 PROCEDURE — 84460 ALANINE AMINO (ALT) (SGPT): CPT | Performed by: PATHOLOGY

## 2024-07-06 PROCEDURE — 36415 COLL VENOUS BLD VENIPUNCTURE: CPT | Performed by: PATHOLOGY

## 2024-07-06 PROCEDURE — 84153 ASSAY OF PSA TOTAL: CPT | Performed by: PATHOLOGY

## 2024-07-08 ENCOUNTER — TELEPHONE (OUTPATIENT)
Dept: UROLOGY | Facility: CLINIC | Age: 79
End: 2024-07-08
Payer: MEDICARE

## 2024-07-08 NOTE — TELEPHONE ENCOUNTER
Left Voicemail (1st Attempt) for the patient to call back and schedule the following:    Appointment type: return patient  Provider: gris hobbs  Return date: next available, soonest as possible  Specialty phone number: [phone]

## 2024-07-08 NOTE — TELEPHONE ENCOUNTER
Clinic coordinators, would you please assist me in getting a hold of this patient to schedule a follow-up visit with me either in person or virtually to discuss the results of his recent PSA test?  Thank you!

## 2024-08-24 ENCOUNTER — HEALTH MAINTENANCE LETTER (OUTPATIENT)
Age: 79
End: 2024-08-24

## 2024-09-24 ENCOUNTER — MYC REFILL (OUTPATIENT)
Dept: INTERNAL MEDICINE | Facility: CLINIC | Age: 79
End: 2024-09-24
Payer: MEDICARE

## 2024-09-24 DIAGNOSIS — E78.00 HYPERCHOLESTEREMIA: Primary | ICD-10-CM

## 2024-09-30 NOTE — TELEPHONE ENCOUNTER
simvastatin (ZOCOR) 40 MG tablet   Last Written Prescription Date:  Historical  Last Office Visit : 6/10/2024 [facility] [place]    Future Office visit:  none  Routing simvastatin (ZOCOR) 40 MG tablet refill request to provider for review/approval because: Medication is reported/historical

## 2024-10-01 RX ORDER — SIMVASTATIN 40 MG
40 TABLET ORAL DAILY
Qty: 90 TABLET | Refills: 3 | Status: SHIPPED | OUTPATIENT
Start: 2024-10-01

## 2024-10-30 ENCOUNTER — TRANSFERRED RECORDS (OUTPATIENT)
Dept: HEALTH INFORMATION MANAGEMENT | Facility: CLINIC | Age: 79
End: 2024-10-30
Payer: MEDICARE

## 2024-11-02 ENCOUNTER — HEALTH MAINTENANCE LETTER (OUTPATIENT)
Age: 79
End: 2024-11-02

## 2024-11-08 ENCOUNTER — ANCILLARY PROCEDURE (OUTPATIENT)
Dept: GENERAL RADIOLOGY | Facility: CLINIC | Age: 79
End: 2024-11-08
Attending: INTERNAL MEDICINE
Payer: MEDICARE

## 2024-11-08 DIAGNOSIS — K22.0 ACHALASIA: ICD-10-CM

## 2024-11-08 PROCEDURE — 74220 X-RAY XM ESOPHAGUS 1CNTRST: CPT | Mod: GC | Performed by: RADIOLOGY

## 2024-11-09 DIAGNOSIS — J45.30 MILD PERSISTENT ASTHMA, UNSPECIFIED WHETHER COMPLICATED: ICD-10-CM

## 2024-11-12 RX ORDER — BUDESONIDE AND FORMOTEROL FUMARATE DIHYDRATE 80; 4.5 UG/1; UG/1
2 AEROSOL RESPIRATORY (INHALATION) 2 TIMES DAILY
Qty: 10.2 G | Refills: 5 | Status: SHIPPED | OUTPATIENT
Start: 2024-11-12

## 2024-11-12 NOTE — TELEPHONE ENCOUNTER
LVD:  6/10/2024  [facility] [place]     Ari Munoz MD  Internal Medicine   SYMBICORT  Refilled per protocol.

## 2024-12-01 SDOH — HEALTH STABILITY: PHYSICAL HEALTH: ON AVERAGE, HOW MANY MINUTES DO YOU ENGAGE IN EXERCISE AT THIS LEVEL?: 60 MIN

## 2024-12-01 SDOH — HEALTH STABILITY: PHYSICAL HEALTH: ON AVERAGE, HOW MANY DAYS PER WEEK DO YOU ENGAGE IN MODERATE TO STRENUOUS EXERCISE (LIKE A BRISK WALK)?: 2 DAYS

## 2024-12-01 ASSESSMENT — SOCIAL DETERMINANTS OF HEALTH (SDOH): HOW OFTEN DO YOU GET TOGETHER WITH FRIENDS OR RELATIVES?: ONCE A WEEK

## 2024-12-02 ENCOUNTER — TRANSFERRED RECORDS (OUTPATIENT)
Dept: HEALTH INFORMATION MANAGEMENT | Facility: CLINIC | Age: 79
End: 2024-12-02
Payer: MEDICARE

## 2024-12-06 ENCOUNTER — LAB (OUTPATIENT)
Dept: LAB | Facility: CLINIC | Age: 79
End: 2024-12-06
Payer: MEDICARE

## 2024-12-06 ENCOUNTER — OFFICE VISIT (OUTPATIENT)
Dept: INTERNAL MEDICINE | Facility: CLINIC | Age: 79
End: 2024-12-06
Payer: MEDICARE

## 2024-12-06 VITALS
WEIGHT: 151.2 LBS | BODY MASS INDEX: 22.91 KG/M2 | HEIGHT: 68 IN | DIASTOLIC BLOOD PRESSURE: 76 MMHG | SYSTOLIC BLOOD PRESSURE: 113 MMHG | HEART RATE: 68 BPM | TEMPERATURE: 97.8 F | RESPIRATION RATE: 15 BRPM | OXYGEN SATURATION: 93 %

## 2024-12-06 DIAGNOSIS — E11.9 TYPE 2 DIABETES MELLITUS WITHOUT COMPLICATION, WITHOUT LONG-TERM CURRENT USE OF INSULIN (H): ICD-10-CM

## 2024-12-06 DIAGNOSIS — Z12.5 ENCOUNTER FOR SCREENING FOR MALIGNANT NEOPLASM OF PROSTATE: ICD-10-CM

## 2024-12-06 DIAGNOSIS — K21.00 GASTROESOPHAGEAL REFLUX DISEASE WITH ESOPHAGITIS, UNSPECIFIED WHETHER HEMORRHAGE: ICD-10-CM

## 2024-12-06 DIAGNOSIS — Z11.59 NEED FOR HEPATITIS C SCREENING TEST: ICD-10-CM

## 2024-12-06 DIAGNOSIS — M10.9 GOUT, UNSPECIFIED CAUSE, UNSPECIFIED CHRONICITY, UNSPECIFIED SITE: ICD-10-CM

## 2024-12-06 DIAGNOSIS — R97.20 ELEVATED PROSTATE SPECIFIC ANTIGEN (PSA): ICD-10-CM

## 2024-12-06 DIAGNOSIS — I10 BENIGN ESSENTIAL HYPERTENSION: ICD-10-CM

## 2024-12-06 DIAGNOSIS — E11.9 TYPE 2 DIABETES MELLITUS WITHOUT COMPLICATION, WITHOUT LONG-TERM CURRENT USE OF INSULIN (H): Primary | ICD-10-CM

## 2024-12-06 DIAGNOSIS — J45.30 MILD PERSISTENT ASTHMA, UNSPECIFIED WHETHER COMPLICATED: ICD-10-CM

## 2024-12-06 DIAGNOSIS — Z23 NEED FOR TDAP VACCINATION: ICD-10-CM

## 2024-12-06 DIAGNOSIS — J01.00 SUBACUTE MAXILLARY SINUSITIS: ICD-10-CM

## 2024-12-06 DIAGNOSIS — Z11.59 NEED FOR HEPATITIS C SCREENING TEST: Primary | ICD-10-CM

## 2024-12-06 DIAGNOSIS — R05.3 CHRONIC COUGH: ICD-10-CM

## 2024-12-06 LAB
ALBUMIN SERPL BCG-MCNC: 4 G/DL (ref 3.5–5.2)
ALP SERPL-CCNC: 80 U/L (ref 40–150)
ALT SERPL W P-5'-P-CCNC: 16 U/L (ref 0–70)
ANION GAP SERPL CALCULATED.3IONS-SCNC: 8 MMOL/L (ref 7–15)
AST SERPL W P-5'-P-CCNC: 22 U/L (ref 0–45)
BILIRUB SERPL-MCNC: 0.9 MG/DL
BUN SERPL-MCNC: 18.5 MG/DL (ref 8–23)
CALCIUM SERPL-MCNC: 9.2 MG/DL (ref 8.8–10.4)
CHLORIDE SERPL-SCNC: 101 MMOL/L (ref 98–107)
CHOLEST SERPL-MCNC: 128 MG/DL
CREAT SERPL-MCNC: 1.08 MG/DL (ref 0.67–1.17)
CREAT UR-MCNC: 58.4 MG/DL
EGFRCR SERPLBLD CKD-EPI 2021: 70 ML/MIN/1.73M2
ERYTHROCYTE [DISTWIDTH] IN BLOOD BY AUTOMATED COUNT: 14.5 % (ref 10–15)
EST. AVERAGE GLUCOSE BLD GHB EST-MCNC: 128 MG/DL
FASTING STATUS PATIENT QL REPORTED: YES
FASTING STATUS PATIENT QL REPORTED: YES
GLUCOSE SERPL-MCNC: 98 MG/DL (ref 70–99)
HBA1C MFR BLD: 6.1 %
HCO3 SERPL-SCNC: 27 MMOL/L (ref 22–29)
HCT VFR BLD AUTO: 47.6 % (ref 40–53)
HCV AB SERPL QL IA: NONREACTIVE
HDLC SERPL-MCNC: 40 MG/DL
HGB BLD-MCNC: 15.9 G/DL (ref 13.3–17.7)
LDLC SERPL CALC-MCNC: 55 MG/DL
MCH RBC QN AUTO: 32.1 PG (ref 26.5–33)
MCHC RBC AUTO-ENTMCNC: 33.4 G/DL (ref 31.5–36.5)
MCV RBC AUTO: 96 FL (ref 78–100)
MICROALBUMIN UR-MCNC: <12 MG/L
MICROALBUMIN/CREAT UR: NORMAL MG/G{CREAT}
NONHDLC SERPL-MCNC: 88 MG/DL
PLATELET # BLD AUTO: 183 10E3/UL (ref 150–450)
POTASSIUM SERPL-SCNC: 4.1 MMOL/L (ref 3.4–5.3)
PROT SERPL-MCNC: 6.8 G/DL (ref 6.4–8.3)
PSA SERPL DL<=0.01 NG/ML-MCNC: 12.22 NG/ML (ref 0–6.5)
RBC # BLD AUTO: 4.95 10E6/UL (ref 4.4–5.9)
SODIUM SERPL-SCNC: 136 MMOL/L (ref 135–145)
TRIGL SERPL-MCNC: 163 MG/DL
URATE SERPL-MCNC: 3.1 MG/DL (ref 3.4–7)
WBC # BLD AUTO: 9.6 10E3/UL (ref 4–11)

## 2024-12-06 PROCEDURE — 83036 HEMOGLOBIN GLYCOSYLATED A1C: CPT | Performed by: INTERNAL MEDICINE

## 2024-12-06 PROCEDURE — 99000 SPECIMEN HANDLING OFFICE-LAB: CPT | Performed by: PATHOLOGY

## 2024-12-06 PROCEDURE — G0439 PPPS, SUBSEQ VISIT: HCPCS | Performed by: INTERNAL MEDICINE

## 2024-12-06 PROCEDURE — 86803 HEPATITIS C AB TEST: CPT | Performed by: INTERNAL MEDICINE

## 2024-12-06 PROCEDURE — G0103 PSA SCREENING: HCPCS | Performed by: PATHOLOGY

## 2024-12-06 PROCEDURE — 84550 ASSAY OF BLOOD/URIC ACID: CPT | Performed by: PATHOLOGY

## 2024-12-06 PROCEDURE — 80061 LIPID PANEL: CPT | Performed by: PATHOLOGY

## 2024-12-06 PROCEDURE — 82043 UR ALBUMIN QUANTITATIVE: CPT | Performed by: INTERNAL MEDICINE

## 2024-12-06 PROCEDURE — 80053 COMPREHEN METABOLIC PANEL: CPT | Performed by: PATHOLOGY

## 2024-12-06 PROCEDURE — 82570 ASSAY OF URINE CREATININE: CPT | Performed by: INTERNAL MEDICINE

## 2024-12-06 PROCEDURE — 85027 COMPLETE CBC AUTOMATED: CPT | Performed by: PATHOLOGY

## 2024-12-06 PROCEDURE — 36415 COLL VENOUS BLD VENIPUNCTURE: CPT | Performed by: PATHOLOGY

## 2024-12-06 RX ORDER — INHALER, ASSIST DEVICES
SPACER (EA) MISCELLANEOUS
Qty: 1 EACH | Refills: 0 | Status: CANCELLED | OUTPATIENT
Start: 2024-12-06

## 2024-12-06 RX ORDER — CODEINE PHOSPHATE AND GUAIFENESIN 10; 100 MG/5ML; MG/5ML
1-2 SOLUTION ORAL EVERY 4 HOURS PRN
Qty: 118 ML | Refills: 0 | Status: SHIPPED | OUTPATIENT
Start: 2024-12-06

## 2024-12-06 NOTE — PROGRESS NOTES
Preventive Care Visit  [facility]  Mary Wright MD, Internal Medicine  Dec 6, 2024        Subjective   Jac is a 79 year old, presenting for the following:  Physical        12/6/2024     8:34 AM   Additional Questions   Roomed by [facility]  Jac is here for his annual physical and follow up of BPH, COPD (chronic obstructive pulmonary disease) (H), Hypertension, Type 2 diabetes mellitus without complication, without long-term current use of insulin (H) (2/29/2024), and Uncomplicated asthma.    He feels well physically, but mentally is still grieving the loss of his wife to cancer back in June.  However he feels well supported by family, friends and so on.  No plans to move or relocate.      We also discussed his slowly rising PSA with a normal prostate MRI, taking the active surveillance approach.  Blood pressure today is excellent.  Due for labs.  No changes to past, family or social history and ROS negative.    Health Care Directive  Patient does not have a Health Care Directive:       12/1/2024   General Health   How would you rate your overall physical health? Good   Feel stress (tense, anxious, or unable to sleep) Not at all            12/1/2024   Nutrition   Diet: Regular (no restrictions)            12/1/2024   Exercise   Days per week of moderate/strenous exercise 2 days   Average minutes spent exercising at this level 60 min      (!) EXERCISE CONCERN      12/1/2024   Social Factors   Frequency of gathering with friends or relatives Once a week   Worry food won't last until get money to buy more No   Food not last or not have enough money for food? No   Do you have housing? (Housing is defined as stable permanent housing and does not include staying ouside in a car, in a tent, in an abandoned building, in an overnight shelter, or couch-surfing.) Yes   Are you worried about losing your housing? No   Lack of transportation? No   Unable to get  utilities (heat,electricity)? No            12/1/2024   Fall Risk   Fallen 2 or more times in the past year? No    Trouble with walking or balance? No        Patient-reported          12/1/2024   Activities of Daily Living- Home Safety   Needs help with the following daily activites None of the above   Safety concerns in the home None of the above            12/1/2024   Dental   Dentist two times every year? Yes            12/1/2024   Hearing Screening   Hearing concerns? None of the above            12/1/2024   Driving Risk Screening   Patient/family members have concerns about driving No            12/1/2024   General Alertness/Fatigue Screening   Have you been more tired than usual lately? No            12/1/2024   Urinary Incontinence Screening   Bothered by leaking urine in past 6 months No            12/1/2024   TB Screening   Were you born outside of the [place]? No            Today's PHQ-2 Score:       12/5/2024     6:50 PM   PHQ-2 ( 1999 [employer])   Q1: Little interest or pleasure in doing things 0    Q2: Feeling down, depressed or hopeless 0    PHQ-2 Score 0    Q1: Little interest or pleasure in doing things Not at all   Q2: Feeling down, depressed or hopeless Not at all   PHQ-2 Score 0       Patient-reported           12/1/2024   Substance Use   Alcohol more than 3/day or more than 7/wk No   Do you have a current opioid prescription? No   How severe/bad is pain from 1 to 10? 0/10 (No Pain)   Do you use any other substances recreationally? No        Social History     Tobacco Use    Smoking status: Never    Smokeless tobacco: Never   Substance Use Topics    Alcohol use: Never    Drug use: Never       ASCVD Risk   The 10-year ASCVD risk score (Doni OSMAN, et al., 2019) is: 48.1%    Values used to calculate the score:      Age: 79 years      Sex: Male      Is Non- : No      Diabetic: Yes      Tobacco smoker: No      Systolic Blood Pressure: 113 mmHg      Is BP treated: Yes      HDL  Cholesterol: 42 mg/dL      Total Cholesterol: 142 mg/dL            Reviewed and updated as needed this visit by Provider                      Current providers sharing in care for this patient include:  Patient Care Team:  Mary Kwan MD as PCP - General (Internal Medicine)  Jocelyn Cannon MD as MD (Neurology)  Mary Kwan MD as Assigned PCP  Mary Kwan MD as MD (Internal Medicine)  Boubacar Lenz PA-C as Physician Assistant (Physician Assistant - Medical)  Allie Ling PA-C as Physician Assistant (Dermatology)  Flaquita Lewis NP as Nurse Practitioner  Carissa Sen DPM, Podiatry/Foot and Ankle Surgery as Assigned Musculoskeletal Provider  Shwetha Dwyer MD as Resident (Internal Medicine)  Jocelyn Cannon MD as Assigned Neuroscience Provider  Allie Ling PA-C as Assigned Surgical Provider  Kemi Edward APRN CNP as Nurse Practitioner (Internal Medicine)    The following health maintenance items are reviewed in Epic and correct as of today:  Health Maintenance   Topic Date Due    MICROALBUMIN  Never done    DIABETIC FOOT EXAM  Never done    ADVANCE CARE PLANNING  Never done    COPD ACTION PLAN  Never done    HEPATITIS C SCREENING  Never done    MEDICARE ANNUAL WELLNESS VISIT  04/16/2019    DTAP/TDAP/TD IMMUNIZATION (2 - Td or Tdap) 09/21/2022    A1C  08/22/2024    COVID-19 Vaccine (4 - 2024-25 season) 11/04/2024    LIPID  12/27/2024    ANNUAL REVIEW OF HM ORDERS  12/27/2024    EYE EXAM  04/24/2025    BMP  05/22/2025    FALL RISK ASSESSMENT  12/06/2025    SPIROMETRY  Completed    PHQ-2 (once per calendar year)  Completed    INFLUENZA VACCINE  Completed    Pneumococcal Vaccine: 65+ Years  Completed    ZOSTER IMMUNIZATION  Completed    RSV VACCINE  Completed    HPV IMMUNIZATION  Aged Out    MENINGITIS IMMUNIZATION  Aged Out    RSV MONOCLONAL ANTIBODY  Aged Out          Objective    Exam  /76 (BP Location: Left arm,  "Patient Position: Sitting, Cuff Size: Adult Regular)   Pulse 68   Temp 97.8  F (36.6  C) (Oral)   Resp 15   Ht 1.727 m (5' 7.99\")   Wt 68.6 kg (151 lb 3.2 oz)   SpO2 93%   BMI 23.00 kg/m     Estimated body mass index is 23 kg/m  as calculated from the following:    Height as of this encounter: 1.727 m (5' 7.99\").    Weight as of this encounter: 68.6 kg (151 lb 3.2 oz).    Physical Exam  GENERAL: alert and no distress  EYES: Eyes grossly normal to inspection, PERRL and conjunctivae and sclerae normal  HENT: ear canals and TM's normal, nose and mouth without ulcers or lesions  NECK: no adenopathy, no asymmetry, masses, or scars  RESP: lungs clear to auscultation - no rales, rhonchi or wheezes  CV: regular rate and rhythm, normal S1 S2, no S3 or S4, no murmur, click or rub, no peripheral edema  ABDOMEN: soft, nontender, no hepatosplenomegaly, no masses and bowel sounds normal  MS: no gross musculoskeletal defects noted, no edema  SKIN: no suspicious lesions or rashes  NEURO: Normal strength and tone, mentation intact and speech normal  PSYCH: mentation appears normal, affect normal/bright         No data to display                     A/P Jac was seen today for physical and followup; overall doing well      Type 2 diabetes mellitus without complication, without long-term current use of insulin (H)  -     HEMOGLOBIN A1C; Future  -     Albumin Random Urine Quantitative with Creat Ratio; Future  -     Lipid panel reflex to direct LDL Non-fasting; Future    Gout, unspecified cause, unspecified chronicity, unspecified site  -     Uric acid; Future    Benign essential hypertension  -     Comprehensive metabolic panel (BMP + Alb, Alk Phos, ALT, AST, Total. Bili, TP); Future    Gastroesophageal reflux disease with esophagitis, unspecified whether hemorrhage  -     CBC with platelets; Future    Chronic cough  -     guaiFENesin-codeine (ROBITUSSIN AC) 100-10 MG/5ML solution; Take 5-10 mLs by mouth every 4 hours as " needed for cough.    Encounter for screening for malignant neoplasm of prostate  -     PSA, screen; Future    RTC in one year or sooner prn    Mary Wright MD         Signed Electronically by: Mary Wright MD

## 2024-12-09 ENCOUNTER — MYC REFILL (OUTPATIENT)
Dept: INTERNAL MEDICINE | Facility: CLINIC | Age: 79
End: 2024-12-09
Payer: MEDICARE

## 2024-12-09 DIAGNOSIS — E11.9 TYPE 2 DIABETES MELLITUS WITHOUT COMPLICATION, WITHOUT LONG-TERM CURRENT USE OF INSULIN (H): Primary | ICD-10-CM

## 2024-12-10 ENCOUNTER — MYC MEDICAL ADVICE (OUTPATIENT)
Dept: INTERNAL MEDICINE | Facility: CLINIC | Age: 79
End: 2024-12-10

## 2024-12-10 ENCOUNTER — OFFICE VISIT (OUTPATIENT)
Dept: INTERNAL MEDICINE | Facility: CLINIC | Age: 79
End: 2024-12-10
Payer: MEDICARE

## 2024-12-10 VITALS
SYSTOLIC BLOOD PRESSURE: 104 MMHG | HEART RATE: 76 BPM | WEIGHT: 149.8 LBS | RESPIRATION RATE: 16 BRPM | OXYGEN SATURATION: 96 % | BODY MASS INDEX: 22.7 KG/M2 | DIASTOLIC BLOOD PRESSURE: 71 MMHG | HEIGHT: 68 IN

## 2024-12-10 DIAGNOSIS — E11.9 TYPE 2 DIABETES MELLITUS WITHOUT COMPLICATION, WITHOUT LONG-TERM CURRENT USE OF INSULIN (H): Primary | ICD-10-CM

## 2024-12-10 DIAGNOSIS — E11.9 TYPE 2 DIABETES MELLITUS WITHOUT COMPLICATION, WITHOUT LONG-TERM CURRENT USE OF INSULIN (H): ICD-10-CM

## 2024-12-10 DIAGNOSIS — K22.0 ACHALASIA: ICD-10-CM

## 2024-12-10 DIAGNOSIS — Z01.818 PREOP GENERAL PHYSICAL EXAM: Primary | ICD-10-CM

## 2024-12-10 RX ORDER — METFORMIN HYDROCHLORIDE 500 MG/1
500 TABLET, EXTENDED RELEASE ORAL
Qty: 90 TABLET | Refills: 1 | Status: CANCELLED | OUTPATIENT
Start: 2024-12-10

## 2024-12-10 NOTE — PROGRESS NOTES
Preoperative Evaluation  [facility] INTERNAL MEDICINE [address]  4TH FLOOR  [facility] [address]  Phone: [phone]  Fax: [phone]  Primary Provider: Mary Wright MD  Pre-op Performing Provider: Frankie Purcell MD  Dec 10, 2024             12/5/2024   Surgical Information   What procedure is being done? Esophogosphy    Facility or Hospital where procedure/surgery will be performed: [facility]    Who is doing the procedure / surgery? David Parks    Date of surgery / procedure: 12/16/224    Time of surgery / procedure: 11am    Where do you plan to recover after surgery? at home with family        Patient-reported     Fax number for surgical facility: [phone]    Assessment & Plan   Undergoing Esophagography on 12/16/24. Negative ROS today. Vitals stable and exam unremarkable. Recent labs unremarkable. The proposed surgical procedure is considered LOW risk. RCRI is Class I risk. Recommendation is that he is medically optimized for this low risk procedure      - No identified additional risk factors other than previously addressed    Antiplatelet or Anticoagulation Medication Instructions   - Patient is on no antiplatelet or anticoagulation medications.    Additional Medication Instructions   - metformin: DO NOT TAKE day of surgery.    Recommendation  Approval given to proceed with proposed procedure, without further diagnostic evaluation.    Nitish Nathan is a 79 year old, presenting for the following:  Pre-Op Exam          12/10/2024     1:20 PM   Additional Questions   Roomed by [facility] EMT     HPI related to upcoming procedure: Reza is a 79 year old male with achalasia, scheduled for esophagography on 12/16. He denies chest pain or shortness of breath and is not on anticoagulants. He has had two esophageal surgeries in the past, both of which he tolerated well. His functional METs are <4. He goes to the gym three times a week.        12/5/2024   Pre-Op  Questionnaire   Have you ever had a heart attack or stroke? No    Have you ever had surgery on your heart or blood vessels, such as a stent placement, a coronary artery bypass, or surgery on an artery in your head, neck, heart, or legs? No    Do you have chest pain with activity? No    Do you have a history of heart failure? No    Do you currently have a cold, bronchitis or symptoms of other infection? No    Do you have a cough, shortness of breath, or wheezing? No    Do you or anyone in your family have previous history of blood clots? No    Do you or does anyone in your family have a serious bleeding problem such as prolonged bleeding following surgeries or cuts? No    Have you ever had problems with anemia or been told to take iron pills? No    Have you had any abnormal blood loss such as black, tarry or bloody stools? No    Have you ever had a blood transfusion? No    Are you willing to have a blood transfusion if it is medically needed before, during, or after your surgery? Yes    Have you or any of your relatives ever had problems with anesthesia? No    Do you have sleep apnea, excessive snoring or daytime drowsiness? No    Do you have any artifical heart valves or other implanted medical devices like a pacemaker, defibrillator, or continuous glucose monitor? No    Do you have artificial joints? No    Are you allergic to latex? No        Patient-reported       Preoperative Review of    reviewed - no record of controlled substances prescribed.      Patient Active Problem List    Diagnosis Date Noted    Abnormal MRI, lumbar spine 05/08/2024     Priority: Medium    Brain lesion 05/08/2024     Priority: Medium    MRI of brain abnormal 05/08/2024     Priority: Medium    Type 2 diabetes mellitus without complication, without long-term current use of insulin (H) 02/29/2024     Priority: Medium      Past Medical History:   Diagnosis Date    Cancer (H)     COPD (chronic obstructive pulmonary disease) (H)      Hypertension     Type 2 diabetes mellitus without complication, without long-term current use of insulin (H) 2/29/2024    Uncomplicated asthma      Past Surgical History:   Procedure Laterality Date    ABDOMEN SURGERY      Paraesophageal hernia, JIST Tumor     Current Outpatient Medications   Medication Sig Dispense Refill    allopurinol (ZYLOPRIM) 300 MG tablet Take 1 tablet (300 mg) by mouth daily 90 tablet 3    budesonide-formoterol (SYMBICORT) 80-4.5 MCG/ACT Inhaler Inhale 2 puffs into the lungs 2 times daily. 10.2 g 5    calcium carbonate (TUMS) 500 MG chewable tablet Take 1 chew tab by mouth as needed for heartburn      cetirizine (ZYRTEC) 10 MG tablet Take 10 mg by mouth daily      cyclobenzaprine (FLEXERIL) 5 MG tablet Take 5 mg by mouth as needed for muscle spasms      doxylamine (UNISOM) 12.5 mg TABS half-tab Take 12.5 mg by mouth at bedtime      famotidine (PEPCID) 40 MG tablet Take 1 tablet (40 mg) by mouth 2 times daily 180 tablet 3    fluticasone (FLONASE) 50 MCG/ACT nasal spray 2 sprays daily      guaiFENesin-codeine (ROBITUSSIN AC) 100-10 MG/5ML solution Take 5-10 mLs by mouth every 4 hours as needed for cough. 118 mL 0    ipratropium-albuterol (COMBIVENT RESPIMAT)  MCG/ACT inhaler Inhale 1 puff into the lungs as needed for shortness of breath, wheezing or cough      lisinopril (ZESTRIL) 20 MG tablet Take 1 tablet (20 mg) by mouth daily 90 tablet 3    metFORMIN (GLUCOPHAGE XR) 500 MG 24 hr tablet Take 1 tablet by mouth daily      methylPREDNISolone (MEDROL DOSEPAK) 4 MG tablet therapy pack Follow Package Directions 21 tablet 0    pantoprazole (PROTONIX) 40 MG EC tablet Take 1 tablet (40 mg) by mouth 2 times daily 180 tablet 3    predniSONE (DELTASONE) 20 MG tablet Take 2 tablets (40 mg) by mouth daily for 5 days 10 tablet 0    simvastatin (ZOCOR) 40 MG tablet Take 1 tablet (40 mg) by mouth daily. 90 tablet 3    spacer (OPTICHAMBER ADEN) holding chamber For use with inhaler 1 each 0     "triamcinolone (KENALOG) 0.1 % external ointment Apply topically as needed for irritation      loratadine (CLARITIN) 10 MG tablet Take 10 mg by mouth daily (Patient not taking: Reported on 5/28/2024)         Allergies   Allergen Reactions    Bee Venom Unknown and Anaphylaxis    Wasp Venom Protein Other (See Comments)    Cats     Seasonal Allergies         Social History     Tobacco Use    Smoking status: Never    Smokeless tobacco: Never   Substance Use Topics    Alcohol use: Never       History   Drug Use Unknown           Objective    /71 (BP Location: Right arm, Patient Position: Sitting, Cuff Size: Adult Regular)   Pulse 76   Resp 16   Ht 1.727 m (5' 7.99\")   Wt 67.9 kg (149 lb 12.8 oz)   SpO2 96%   BMI 22.78 kg/m     Estimated body mass index is 22.78 kg/m  as calculated from the following:    Height as of this encounter: 1.727 m (5' 7.99\").    Weight as of this encounter: 67.9 kg (149 lb 12.8 oz).  Physical Exam  Constitutional:       Appearance: Normal appearance.   HENT:      Head: Normocephalic.      Mouth/Throat:      Mouth: Mucous membranes are moist.   Eyes:      Extraocular Movements: Extraocular movements intact.      Pupils: Pupils are equal, round, and reactive to light.   Cardiovascular:      Rate and Rhythm: Normal rate and regular rhythm.   Pulmonary:      Effort: Pulmonary effort is normal.      Breath sounds: Normal breath sounds.   Abdominal:      General: Abdomen is flat.      Palpations: Abdomen is soft.   Musculoskeletal:         General: Normal range of motion.      Cervical back: Normal range of motion.   Skin:     General: Skin is warm.   Neurological:      General: No focal deficit present.      Mental Status: He is alert and oriented to person, place, and time.   Psychiatric:         Mood and Affect: Mood normal.         Behavior: Behavior normal.         Recent Labs   Lab Test 12/06/24  0956 05/22/24  1252 12/27/23  1151   HGB 15.9  --  16.9     --  259    139  " --    POTASSIUM 4.1 3.8  --    CR 1.08 1.12  --    A1C 6.1* 5.9* 6.2*        Diagnostics  No labs were ordered during this visit. Pt had recent labs on 12/6  No EKG required, no history of coronary heart disease, significant arrhythmia, peripheral arterial disease or other structural heart disease.    Revised Cardiac Risk Index (RCRI)  The patient has the following serious cardiovascular risks for perioperative complications:   - No serious cardiac risks = 0 points     RCRI Interpretation: 0 points: Class I (very low risk - 0.4% complication rate)    Patient was seen and plan discussed with attending Ari Mckenna       Signed Electronically by: Frankie Purcell MD  A copy of this evaluation report is provided to the requesting physician.

## 2024-12-10 NOTE — PATIENT INSTRUCTIONS
How to Take Your Medication Before Surgery  Preoperative Medication Instructions   Antiplatelet or Anticoagulation Medication Instructions   - Patient is on no antiplatelet or anticoagulation medications.    Additional Medication Instructions   - metformin: DO NOT TAKE day of surgery.       Patient Education   Preparing for Your Surgery  For Adults  Getting started  In most cases, a nurse will call to review your health history and instructions. They will give you an arrival time based on your scheduled surgery time. Please be ready to share:  Your doctor's clinic name and phone number  Your medical, surgical, and anesthesia history  A list of allergies and sensitivities  A list of medicines, including herbal treatments and over-the-counter drugs  Whether the patient has a legal guardian (ask how to send us the papers in advance)  Note: You may not receive a call if you were seen at our PAC (Preoperative Assessment Center).  Please tell us if you're pregnant--or if there's any chance you might be pregnant. Some surgeries may injure a fetus (unborn baby), so they require a pregnancy test. Surgeries that are safe for a fetus don't always need a test, and you can choose whether to have one.   Preparing for surgery  Within 10 to 30 days of surgery: Have a pre-op exam (sometimes called an H&P, or History and Physical). This can be done at a clinic or pre-operative center.  If you're having a , you may not need this exam. Talk to your care team.  At your pre-op exam, talk to your care team about all medicines you take. (This includes CBD oil and any drugs, such as THC, marijuana, and other forms of cannabis.) If you need to stop any medicine before surgery, ask when to start taking it again.  This is for your safety. Many medicines and drugs can make you bleed too much during surgery. Some change how well surgery (anesthesia) drugs work.  Call your insurance company to let them know you're having surgery. (If you  don't have insurance, call [phone].)  Call your clinic if there's any change in your health. This includes a scrape or scratch near the surgery site, or any signs of a cold (sore throat, runny nose, cough, rash, fever).  Eating and drinking guidelines  For your safety: Unless your surgeon tells you otherwise, follow the guidelines below.  Eat and drink as normal until 8 hours before you arrive for surgery. After that, no food or milk. You can spit out gum when you arrive.  Drink clear liquids until 2 hours before you arrive. These are liquids you can see through, like water, Gatorade, and Propel Water. They also include plain black coffee and tea (no cream or milk).  No alcohol for 24 hours before you arrive. The night before surgery, stop any drinks that contain THC.  If your care team tells you to take medicine on the morning of surgery, it's okay to take it with a sip of water. No other medicines or drugs are allowed (including CBD oil)--follow your care team's instructions.  If you have questions the day of surgery, call your hospital or surgery center.   Preventing infection  Shower or bathe the night before and the morning of surgery. Follow the instructions your clinic gave you. (If no instructions, use regular soap.)  Don't shave or clip hair near your surgery site. We'll remove the hair if needed.  Don't smoke or vape the morning of surgery. No chewing tobacco for 6 hours before you arrive. A nicotine patch is okay. You may spit out nicotine gum when you arrive.  For some surgeries, the surgeon will tell you to fully quit smoking and nicotine.  We will make every effort to keep you safe from infection. We will:  Clean our hands often with soap and water (or an alcohol-based hand rub).  Clean the skin at your surgery site with a special soap that kills germs.  Give you a special gown to keep you warm. (Cold raises the risk of infection.)  Wear hair covers, masks, gowns, and gloves during surgery.  Give  antibiotic medicine, if prescribed. Not all surgeries need this medicine.  What to bring on the day of surgery  Photo ID and insurance card  Copy of your health care directive, if you have one  Glasses and hearing aids (bring cases)  You can't wear contacts during surgery  Inhaler and eye drops, if you use them (tell us about these when you arrive)  CPAP machine or breathing device, if you use them  A few personal items, if spending the night  If you have . . .  A pacemaker, ICD (cardiac defibrillator), or other implant: Bring the ID card.  An implanted stimulator: Bring the remote control.  A legal guardian: Bring a copy of the certified (court-stamped) guardianship papers.  Please remove any jewelry, including body piercings. Leave jewelry and other valuables at home.  If you're going home the day of surgery  You must have a responsible adult drive you home. They should stay with you overnight as well.  If you don't have someone to stay with you, and you aren't safe to go home alone, we may keep you overnight. Insurance often won't pay for this.  After surgery  If it's hard to control your pain or you need more pain medicine, please call your surgeon's office.  Questions?   If you have any questions for your care team, list them here:   ____________________________________________________________________________________________________________________________________________________________________________________________________________________________________________________________  For informational purposes only. Not to replace the advice of your health care provider. Copyright   2003, 2019 [facility]. All rights reserved. Clinically reviewed by Yefri Ontiveros MD. [employer] [id number] - REV 08/24.

## 2024-12-11 RX ORDER — METFORMIN HYDROCHLORIDE 500 MG/1
500 TABLET, EXTENDED RELEASE ORAL DAILY
Qty: 90 TABLET | Refills: 3 | Status: SHIPPED | OUTPATIENT
Start: 2024-12-11

## 2024-12-11 NOTE — TELEPHONE ENCOUNTER
metFORMIN (GLUCOPHAGE XR) 500 MG 24 hr tablet   Last Written Prescription Date:  Historical  Last Office Visit : 12/10/2024  [facility]  Future Office visit:  none  Routing metFORMIN (GLUCOPHAGE XR) 500 MG 24 hr tablet  refill request to provider for review/approval because: Medication is reported/historical  - A1C lab current    Hemoglobin A1C   Date Value Ref Range Status   12/06/2024 6.1 (H) <5.7 % Final     Comment:     Normal <5.7%   Prediabetes 5.7-6.4%    Diabetes 6.5% or higher     Note: Adopted from ADA consensus guidelines.

## 2024-12-11 NOTE — TELEPHONE ENCOUNTER
The RN called and spoke with the patient's pharmacy ([facility]/pharmacy #[id number] - [place], [place], phone number=  P: [phone]). Pharmacy staff verified that the patient does not have any active prescriptions on file for metFORMIN (GLUCOPHAGE XR) 500 mg SR tablet 24 hr  (last metformin rx was on 9/9 but this rx is inactive).

## 2024-12-18 ENCOUNTER — MYC MEDICAL ADVICE (OUTPATIENT)
Dept: INTERNAL MEDICINE | Facility: CLINIC | Age: 79
End: 2024-12-18
Payer: MEDICARE

## 2024-12-20 NOTE — TELEPHONE ENCOUNTER
Left Voicemail (1st Attempt) and Sent Mychart (1st Attempt) for the patient to call back and schedule the following:    Appointment type: Return   Provider: Any [facility] Provider   Return date: Next available  Specialty phone number: [phone]

## 2024-12-23 NOTE — TELEPHONE ENCOUNTER
Spoke with the patient to schedule, but he declined and stated no longer needed. He will call to schedule if needed

## 2024-12-30 ENCOUNTER — MYC MEDICAL ADVICE (OUTPATIENT)
Dept: INTERNAL MEDICINE | Facility: CLINIC | Age: 79
End: 2024-12-30
Payer: MEDICARE

## 2024-12-30 DIAGNOSIS — E11.9 TYPE 2 DIABETES MELLITUS WITHOUT COMPLICATION, WITHOUT LONG-TERM CURRENT USE OF INSULIN (H): Primary | ICD-10-CM

## 2024-12-31 RX ORDER — ACYCLOVIR 400 MG/1
1 TABLET ORAL
Qty: 9 EACH | Refills: 11 | Status: SHIPPED | OUTPATIENT
Start: 2024-12-31

## 2024-12-31 RX ORDER — ACYCLOVIR 400 MG/1
1 TABLET ORAL ONCE
Qty: 1 EACH | Refills: 0 | Status: SHIPPED | OUTPATIENT
Start: 2024-12-31 | End: 2024-12-31

## 2025-03-23 ENCOUNTER — HEALTH MAINTENANCE LETTER (OUTPATIENT)
Age: 80
End: 2025-03-23

## 2025-04-18 ENCOUNTER — MYC MEDICAL ADVICE (OUTPATIENT)
Dept: INTERNAL MEDICINE | Facility: CLINIC | Age: 80
End: 2025-04-18
Payer: MEDICARE

## 2025-04-19 DIAGNOSIS — I10 BENIGN ESSENTIAL HYPERTENSION: ICD-10-CM

## 2025-04-23 RX ORDER — LISINOPRIL 20 MG/1
20 TABLET ORAL DAILY
Qty: 90 TABLET | Refills: 1 | Status: SHIPPED | OUTPATIENT
Start: 2025-04-23

## 2025-04-23 NOTE — TELEPHONE ENCOUNTER
Last Written Prescription:  lisinopril (ZESTRIL) 20 MG tablet 90 tablet 3 7/5/2024 -- No   Sig - Route: Take 1 tablet (20 mg) by mouth daily - Oral   Sent to pharmacy as: Lisinopril 20 MG Oral Tablet (ZESTRIL)   Class: E-Prescribe     ----------------------  Last Visit Date: 12/10/24  Future Visit Date: 4/29/25  ----------------------      Refill decision: Medication refilled per  Medication Refill in Ambulatory Care  policy.      Request from pharmacy:  Requested Prescriptions   Pending Prescriptions Disp Refills    lisinopril (ZESTRIL) 20 MG tablet [Pharmacy Med Name: LISINOPRIL 20 MG TABLET] 50 tablet 1     Sig: TAKE 1 TABLET BY MOUTH EVERY DAY       ACE Inhibitors (Including Combos) Protocol Failed - 4/23/2025 12:19 PM        Failed - Medication is active on med list and the sig matches. RN to manually verify dose and sig if red X/fail.     If the protocol passes (green check), you do not need to verify med dose and sig.    A prescription matches if they are the same clinical intention.    For Example: once daily and every morning are the same.    The protocol can not identify upper and lower case letters as matching and will fail.     For Example: Take 1 tablet (50 mg) by mouth daily     TAKE 1 TABLET (50 MG) BY MOUTH DAILY    For all fails (red x), verify dose and sig.    If the refill does match what is on file, the RN can still proceed to approve the refill request.       If they do not match, route to the appropriate provider.             Passed - Most recent blood pressure under 140/90 in past 12 months- Clinicial or Patient Reported     BP Readings from Last 3 Encounters:   12/10/24 104/71   12/06/24 113/76   06/10/24 112/75       No data recorded            Passed - Medication indicated for associated diagnosis     Medication is associated with one or more of the following diagnoses:     Chronic Kidney Disease (CKD)   Coronary Artery Disease (CAD)   Diabetes   Heart Failure (HF)   Hypertension  (HTN)   Nephropathy   History of myocarditis   Tachycardia induced cardiomyopathy   STEMI (ST elevation myocardial infarction)   Spontaneous dissection of coronary artery   Status post percutaneous transluminal coronary angioplasty            Passed - Recent (12 mo) or future (90 days) visit within the authorizing provider's specialty     The patient must have completed an in-person or virtual visit within the past 12 months or has a future visit scheduled within the next 90 days with the authorizing provider s specialty.  Urgent care and e-visits do not qualify as an office visit for this protocol.          Passed - Most recent GFR on file in the past 12 months >30        Passed - Patient is age 18 or older        Passed - Normal serum potassium on file in past 12 months     Recent Labs   Lab Test 12/06/24  0956   POTASSIUM 4.1

## 2025-04-29 ENCOUNTER — MYC MEDICAL ADVICE (OUTPATIENT)
Dept: INTERNAL MEDICINE | Facility: CLINIC | Age: 80
End: 2025-04-29

## 2025-04-29 ENCOUNTER — ANCILLARY PROCEDURE (OUTPATIENT)
Dept: GENERAL RADIOLOGY | Facility: CLINIC | Age: 80
End: 2025-04-29
Payer: MEDICARE

## 2025-04-29 ENCOUNTER — OFFICE VISIT (OUTPATIENT)
Dept: INTERNAL MEDICINE | Facility: CLINIC | Age: 80
End: 2025-04-29
Payer: MEDICARE

## 2025-04-29 VITALS
RESPIRATION RATE: 14 BRPM | HEIGHT: 68 IN | WEIGHT: 151 LBS | TEMPERATURE: 97.4 F | HEART RATE: 85 BPM | DIASTOLIC BLOOD PRESSURE: 72 MMHG | BODY MASS INDEX: 22.88 KG/M2 | SYSTOLIC BLOOD PRESSURE: 109 MMHG | OXYGEN SATURATION: 94 %

## 2025-04-29 DIAGNOSIS — M25.551 HIP PAIN, RIGHT: Primary | ICD-10-CM

## 2025-04-29 DIAGNOSIS — M25.551 HIP PAIN, RIGHT: ICD-10-CM

## 2025-04-29 PROCEDURE — 73502 X-RAY EXAM HIP UNI 2-3 VIEWS: CPT | Mod: RT | Performed by: RADIOLOGY

## 2025-04-29 ASSESSMENT — PAIN SCALES - GENERAL: PAINLEVEL_OUTOF10: MODERATE PAIN (4)

## 2025-04-29 NOTE — PROGRESS NOTES
Assessment & Plan     Groin pain, right  Patient presents with R groin pain that has occurred for the last 2-3 months and presents after walking for about 1 mile. No masses, skin changes, bulges on exam. Patient is concerned about inguinal hernia, however, there were no findings suggestive of this on exam and patient has had no issues with symptoms when bearing down. Given history of worsening localized dull pain with activity, pain is most consistent with R hip osteoarthritis. Will obtain XR right hip to assess for joint degeneration. Other considerations include tendon injury of abductors or flexors/quads. Would recommend initially starting PT to strengthen joint and surrounding musculature. If significant joint degeneration noted on XR will consider ortho consult.  - Physical Therapy  Referral; Future  - XR Hip Right 2-3 Views; Future    Subjective   Jac is a 79 year old, presenting for the following health issues:  Groin Pain (Started two months, denies any associated trauma, worse with stretching and walking, dull 4/10 pain today)      4/29/2025     9:09 AM   Additional Questions   Roomed by heath     Patient is a 80 yo M w/ Hx of T2DM, HTN, GERD, HLD, presenting with concern for groin pain.    States that he has experienced R sided inguinal pain when walking. This has been going on for 2-3 months. There is no bulge in the area.  Usually the pain comes on after a mile of walking, goes away when stops walking. Takes ibuprofen beforehand which helps with the pain. Describes as an intense dull pain, has been about the same intensity this whole time. Thigh abduction when getting into the car sometimes elicits pain. Has had no previous inguinal hernia. No skin or color changes. No previous groin infections. No constipation. Uses weight machines 2-3 times per week. No dysuria, nocturia, suprapubic pain, fever, chills.       Objective    /72 (BP Location: Right arm, Patient Position: Sitting, Cuff  "Size: Adult Regular)   Pulse 85   Temp 97.4  F (36.3  C) (Oral)   Resp 14   Ht 1.727 m (5' 8\")   Wt 68.5 kg (151 lb)   SpO2 94%   BMI 22.96 kg/m    Body mass index is 22.96 kg/m .  Physical Exam  Constitutional:       Appearance: Normal appearance.   Genitourinary:     Testes:         Right: Mass, tenderness or swelling not present.      Comments: No inguinal hernia noted on exam bilaterally.  Musculoskeletal:         General: No swelling, tenderness or deformity. Normal range of motion.      Comments: Muscular strength of the lower extremities intact bilaterally. Full ROM including internal and external rotation of the hip.    Neurological:      General: No focal deficit present.      Mental Status: He is alert and oriented to person, place, and time.   Psychiatric:         Mood and Affect: Mood normal.         Behavior: Behavior normal.          Signed Electronically by: Francisco Hammer MD  "

## 2025-05-01 NOTE — PROGRESS NOTES
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 This symptom since resolved. 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     He is presenting in follow-up today.    HISTORY OF PRESENT ILLNESS  - No vision complaints.    Last saw neuro-ophthalmology a couple years ago, but has not had any concerns with vision since then.   - No cognitive changes.   - No headaches or seizures.   - No back pain. No numbness, weakness.     No concerns with bladder/ bowel function.   - No fevers, chills, shortness of breath, or chest pain.   - Exercises regularly by walking his dog ([employer]). Lives at home by himself and is independent with ADLs. Planning to go to on a [employer] cruise with his daughter this summer!      MEDICATIONS   Current Outpatient Medications   Medication Sig Dispense Refill    allopurinol (ZYLOPRIM) 300 MG tablet Take 1 tablet (300 mg) by mouth daily 90 tablet 3    calcium carbonate (TUMS) 500 MG chewable tablet Take 1 chew tab by mouth as needed for heartburn      cetirizine (ZYRTEC) 10 MG tablet Take 10 mg by mouth daily      cyclobenzaprine (FLEXERIL) 5 MG tablet Take 5 mg by mouth as needed for muscle spasms      doxylamine (UNISOM) 12.5 mg TABS half-tab Take 12.5 mg by mouth at bedtime      famotidine (PEPCID) 40 MG tablet Take 1 tablet (40 mg) by mouth 2 times daily 180 tablet 3    fluticasone (FLONASE) 50 MCG/ACT nasal spray 2 sprays daily       guaiFENesin-codeine (ROBITUSSIN AC) 100-10 MG/5ML solution Take 5-10 mLs by mouth every 4 hours as needed for cough. 118 mL 0    ipratropium-albuterol (COMBIVENT RESPIMAT)  MCG/ACT inhaler Inhale 1 puff into the lungs as needed for shortness of breath, wheezing or cough      lisinopril (ZESTRIL) 20 MG tablet Take 1 tablet (20 mg) by mouth daily. 90 tablet 1    loratadine (CLARITIN) 10 MG tablet Take 10 mg by mouth daily.      metFORMIN (GLUCOPHAGE XR) 500 MG 24 hr tablet Take 1 tablet (500 mg) by mouth daily. 90 tablet 3    pantoprazole (PROTONIX) 40 MG EC tablet Take 1 tablet (40 mg) by mouth 2 times daily 180 tablet 3    simvastatin (ZOCOR) 40 MG tablet Take 1 tablet (40 mg) by mouth daily. 90 tablet 3    spacer (OPTICHAMBER ADEN) holding chamber For use with inhaler 1 each 0    triamcinolone (KENALOG) 0.1 % external ointment Apply topically as needed for irritation      budesonide-formoterol (SYMBICORT/BREYNA) 80-4.5 MCG/ACT Inhaler Inhale 2 puffs into the lungs 2 times daily. 10.2 g 5     DRUG ALLERGIES   Allergies   Allergen Reactions    Bee Venom Unknown and Anaphylaxis    Wasp Venom Protein Other (See Comments)    Cats     Seasonal Allergies        IMMUNIZATIONS   Immunization History   Administered Date(s) Administered    COVID-19 12+ (MODERNA) 09/09/2024    COVID-19 Monovalent 18+ (Moderna) 02/06/2021, 03/09/2021    Flu, Unspecified 10/01/2020    X8e5-15 Novel Flu 01/05/2010    F1m7-71 Novel Flu P-free 01/05/2010    Hepatitis A (VAQTA)(ADULT 19+) 06/02/2004, 07/01/2004, 12/30/2004    Hepatitis B, Adult (Energix-B/Recombivax HB) 06/02/2004    Influenza (High Dose) Trivalent,PF (Fluzone) 09/20/2015, 11/20/2015, 11/21/2016, 09/16/2017, 09/26/2018, 10/10/2019, 09/09/2024, 11/15/2024    Influenza (IIV3) PF 11/24/2003, 01/05/2005, 11/10/2005, 11/01/2006    Influenza (prior to 2024) 11/14/2010    Influenza Vaccine 65+ (FLUAD) 09/10/2021, 10/02/2022    Influenza Vaccine 65+ (Fluzone HD) 11/10/2020     Influenza, Split Virus, Trivalent, Pf (Fluzone\Fluarix) 11/14/2010    Pneumo Conj 13-V (2010&after) 01/15/2016    Pneumococcal 23 valent 01/10/2001, 09/21/2012    RSV Vaccine (Arexvy) 11/29/2023    TDAP (Adacel,Boostrix) 09/21/2012    Td (Adult), Adsorbed 11/19/1997, 06/02/2004, 07/01/2004    Typhoid Oral 09/10/2013    Yellow Fever 10/18/2013    Zoster recombinant adjuvanted (Shingrix) 04/17/2018, 09/08/2018       ONCOLOGIC HISTORY  -History of GI stromal tumor (GIST)-  -3/15/2021 SURGERY: Gastrectomy for resection.   History of achalasia.    -Brain lesion-  -PRESENTATION: New onset diplopia.  -4/4/2021 MR brain imaging with an abnormal signal in left midbrain and cerebellar vermis with a punctate focus of enhancement.   -4/15/2021 CT of chest, abdomen and pelvis postsurgical changes of GEJ gastrointestinal stromal tumor (GIST) resection with no evidence of metastatic disease.  -4/16/2021 MR L-Spine with a single punctate enhancing focus likely along left outgoing sacral nerve root, which is nonspecific although may reflect a potential drop metastasis in the setting of enhancing and nonenhancing lesions of the brain, or other lesion. No abnormal enhancing lesion seen in the cervical or thoracic spine or cord.  -9/29/2021 MR brain imaging with an infiltrative appearing FLAIR hyperintense lesion within the cerebellar vermis without contrast enhancement is seen which may represent a low grade tumor. This is unchanged from the prior study. The previously noted hyperenhancing nodule along the left aspect of the cerebral aqueduct is again not visualized on this exam.  -9/29/2021 MRI L-spine with a punctate foci of sacral nerve root enhancement noted on MRI L-spine, likely schwannoma or ependymoma, is overall unchanged.  -5/2/2022 MR brain imaging was overall stable compared to prior imaging in 1/3/22, demonstrating stable FLAIR/T2 hyperintense vermis lesion. No new contrast enhancement noted on MRI Brain. Prior punctate  "foci of enhancement in left cerebral aqueduct noted on original April 2021 brain MRI has now resolved.  -5/2/2022 MR L-spine demonstrated overall unchanged punctate foci of sacral nerve root enhancement compared to prior L-spine MRI on 9/29/2021.  -11/2/2022 MR brain imaging with no significant interval change in the inferior medullary velum to cerebellar vermian lesion without associated enhancement, as compared to multiple prior studies dating back to 9/29/2021. This is likely reflective of low-grade neoplasm including subependymoma or other glial lesion.  -8/2/2023 MR brain imaging with static inferior vermian lesion measuring approximately 14 mm, unchanged mild atrophy.  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  -5/7/2024 NEURO-ONC/ MR brain & L-spine imaging: No new neurological concerns. All imaging stable. Recommending ongoing imaging surveillance.  -5/6/2025 NEURO-ONC/ MR brain & L-spine imaging: No new neurological concerns. All imaging stable.      PHYSICAL EXAMINATION  /75 (BP Location: Left arm, Patient Position: Sitting, Cuff Size: Adult Regular)   Pulse 65   Temp 97.3  F (36.3  C) (Oral)   Resp 18   Wt 68.4 kg (150 lb 14.4 oz)   SpO2 94%   BMI 22.94 kg/m    Wt Readings from Last 2 Encounters:   05/06/25 68.4 kg (150 lb 14.4 oz)   04/29/25 68.5 kg (151 lb)      Ht Readings from Last 2 Encounters:   04/29/25 1.727 m (5' 8\")   12/10/24 1.727 m (5' 7.99\")     KPS: 100    -Generally well appearing.  -Respiratory: Normal breath sounds, no audible wheezing.   -Psychiatric: Normal mood and affect. Pleasant, talkative.  -Neurologic:   MENTAL STATUS:     Alert, oriented.    Recall: Intact    Speech fluent.   Comprehension intact.     CRANIAL NERVES:     Symmetric facial movements.   Hearing intact.   No dysarthria.   GAIT:  Walks without assistance.      MEDICAL " "RECORDS  Personally reviewed; Primary care visit from 4/29, indicated for groin pain. The plan was for \"Physical Therapy  Referral. XR Hip Right 2-3 Views.\"    Hip x-ray with 1. No acute osseous abnormality. 2. No substantial degenerative change of either hip. 3. Degenerative changes of visualized lower lumbar spine.    LABS  Personally reviewed all available lab results; HbA1c 5.8, Lipid panel (LDL 63) from 3/20/2025.     IMAGING  Personally reviewed MR brain and L-spine imaging from today.     To my eye, the non-contrast enhancing lesion about the vermis is without an increase in perfusion or diffusion. The extent of the T2 FLAIR remains about ~15mmg.    Formal read; \"1.  Stable appearance overall from prior MRI examinations 11/2/2022, 8/2/2023 and 5/7/2024. Again noted is stable appearance to a nonspecific nonenhancing area of confluent FLAIR hyperintense nodular signal changes in the midline cerebellar vermis marginating and/or including the posterior aspect of the fourth ventricle. This is unchanged from prior study with some areas of nonenhancing hypointense T1 signal series 71107 image 88, series 105 image 13 and series 109 image 10. Small vessels traverse this region on postcontrast imaging without focal enhancement abnormality. Fourth ventricular caliber satisfactory. Stable diagnostic considerations from the prior examinations, stability favors an underlying benign process, such as a hamartoma, low-grade glial tumor or subependymoma. Patient does have history of gastrointestinal stromal tumor and therefore the possibility of stable metastatic disease in the posterior fossa is possible but considered unlikely given stability over time. No new lesions are evident. Continued surveillance MR imaging recommended at clinically appropriate interval to ensure ongoing stability. No corresponding perfusion abnormalities definitely identified in this region. 2.  No new or enlarging mass, mass effect or " "pathologic enhancement is otherwise present. Nothing for a marrow infiltrative process. 3.  No perfusion abnormality is evident elsewhere intracranially. 4.  No acute or chronic hemorrhage. 5.  No recent infarction.\"    To my eye, the contrast enhancing nodule along the L5 nerve root remains ~3-4mm.    Formal read; \"1.  Overall fairly stable examination from 5/7/2024 MR lumbar spine. 2.  Stable degenerative spondylosis throughout the lumbar spine with interspace narrowing and low-grade retrolisthesis at upper and mid lumbar levels. No progressive or high-grade subluxations are evident. Stable enhancing benign Schmorl's node endplate deformity about L3-L4 on previous. 3.  Stable nonspecific homogeneous enhancing nodule within the thecal sac dorsally at L5 to left of midline series 112 image 44 unchanged dating back to 8/2/2023. Favor stable benign nerve sheath tumor/schwannoma. There is reported history of GI stromal tumor and the possibility of stable intradural/leptomeningeal metastatic focus is considered less likely given the stability. 4.  No severe spinal stenosis or severe foraminal compromise at any lumbar level. Stable mild foraminal narrowing L3-L4. No disc herniations at any lumbar level.\"    Imaging was shown to and results were reviewed with Jac.       IMPRESSION  On date of service, 38 minutes was spent in clinic and 12 minutes was spent preparing for the visit through extensive chart review and coordinating care for this high complexity visit. The following is in explanation for the recommendations used to define the plan.       Fortunately, both MR brain and spine imaging from today has remained stable. 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    With regard to his brain imaging; As previously discussed at [facility], the differential for the etiology of this lesion remains broad and includes lesions of low grade etiology like a glioneuronal turmor or a multinodular and vacuolating neuronal " tumor (MVNT), but ultimately, definite diagnosis can only be made on review of the lesion's pathology. Given the asymptomatic nature of this finding plus imaging demonstrated no significant change since 2021 with no new concerning radiographic findings on the most recent scan, there is no intervention, diagnostic or therapeutic, indicated at this time. Therefore, it would be most advantageous to continue with serial MR brain imaging surveillance. If the risks/ benefit ratio changes with time as the result of new imaging findings and/ or progressive neurological/ clinical complaints, then a new plan can be devised.    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 This finding is also asymptomatic. Therefore, since imaging has remained stable for > 4 years, the plan is to repeat imaging per NCCN guidelines on an annual basis.     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      PROBLEM LIST  Brain lesion NOS  Abnormal brain imaging    PLAN  -CANCER-DIRECTED THERAPY-  -Continue imaging surveillance.   -Repeat MR brain in 12 months.     -SEIZURE MANAGEMENT-  -Given the lack of seizure history, there is no indication to prescribe an antiepileptic at this time.     Return to clinic in 5/2026 + imaging.      The longitudinal plan of care for the diagnosis(es)/condition(s) as documented were addressed during this visit. Due to the added complexity in care, I will continue to support Jac in the subsequent management and with ongoing continuity of care.     Patient was also seen and examined by Dr. Alonso Whipple, hematology/ oncology fellow under my direct supervision.     Jocelyn Cannon MD  Neuro-oncology

## 2025-05-02 PROBLEM — M25.551 HIP PAIN, RIGHT: Status: ACTIVE | Noted: 2025-05-02

## 2025-05-03 DIAGNOSIS — J45.30 MILD PERSISTENT ASTHMA, UNSPECIFIED WHETHER COMPLICATED: ICD-10-CM

## 2025-05-06 ENCOUNTER — ONCOLOGY VISIT (OUTPATIENT)
Dept: ONCOLOGY | Facility: CLINIC | Age: 80
End: 2025-05-06
Attending: PSYCHIATRY & NEUROLOGY
Payer: MEDICARE

## 2025-05-06 ENCOUNTER — HOSPITAL ENCOUNTER (OUTPATIENT)
Dept: MRI IMAGING | Facility: CLINIC | Age: 80
Discharge: HOME | End: 2025-05-06
Attending: PSYCHIATRY & NEUROLOGY
Payer: MEDICARE

## 2025-05-06 VITALS
HEART RATE: 65 BPM | RESPIRATION RATE: 18 BRPM | SYSTOLIC BLOOD PRESSURE: 109 MMHG | DIASTOLIC BLOOD PRESSURE: 75 MMHG | OXYGEN SATURATION: 94 % | TEMPERATURE: 97.3 F | BODY MASS INDEX: 22.94 KG/M2 | WEIGHT: 150.9 LBS

## 2025-05-06 DIAGNOSIS — R93.7 ABNORMAL MRI, LUMBAR SPINE: ICD-10-CM

## 2025-05-06 DIAGNOSIS — G93.9 BRAIN LESION: ICD-10-CM

## 2025-05-06 DIAGNOSIS — G93.9 BRAIN LESION: Primary | ICD-10-CM

## 2025-05-06 DIAGNOSIS — R90.89 MRI OF BRAIN ABNORMAL: ICD-10-CM

## 2025-05-06 PROCEDURE — 72158 MRI LUMBAR SPINE W/O & W/DYE: CPT

## 2025-05-06 PROCEDURE — 255N000002 HC RX 255 OP 636: Performed by: PSYCHIATRY & NEUROLOGY

## 2025-05-06 PROCEDURE — G0463 HOSPITAL OUTPT CLINIC VISIT: HCPCS | Performed by: PSYCHIATRY & NEUROLOGY

## 2025-05-06 PROCEDURE — A9585 GADOBUTROL INJECTION: HCPCS | Performed by: PSYCHIATRY & NEUROLOGY

## 2025-05-06 PROCEDURE — 70553 MRI BRAIN STEM W/O & W/DYE: CPT

## 2025-05-06 RX ORDER — BUDESONIDE AND FORMOTEROL FUMARATE DIHYDRATE 80; 4.5 UG/1; UG/1
2 AEROSOL RESPIRATORY (INHALATION) 2 TIMES DAILY
Qty: 10.2 G | Refills: 5 | Status: SHIPPED | OUTPATIENT
Start: 2025-05-06

## 2025-05-06 RX ORDER — GADOBUTROL 604.72 MG/ML
15 INJECTION INTRAVENOUS ONCE
Status: COMPLETED | OUTPATIENT
Start: 2025-05-06 | End: 2025-05-06

## 2025-05-06 RX ADMIN — GADOBUTROL 15 ML: 604.72 INJECTION INTRAVENOUS at 09:22

## 2025-05-06 ASSESSMENT — PAIN SCALES - GENERAL: PAINLEVEL_OUTOF10: NO PAIN (0)

## 2025-05-06 NOTE — TELEPHONE ENCOUNTER
Last Written Prescription:  budesonide-formoterol (SYMBICORT) 80-4.5 MCG/ACT Inhaler 10.2 g 5 11/12/2024 -- No   Sig - Route: Inhale 2 puffs into the lungs 2 times daily. - Inhalation     ----------------------  Last Visit Date:   4/29/2025  [facility] [place]      Future Visit Date: 7/11/2025  ----------------------       Refill decision: Medication unable to be refilled by RN due to: Overdue labs/test:  ACT        5/22/2024    12:09 PM 5/24/2024     8:17 AM   ACT Total Scores   ACT TOTAL SCORE (Goal Greater than or Equal to 20) 22 24   In the past 12 months, how many times did you visit the emergency room for your asthma without being admitted to the hospital? 0 0   In the past 12 months, how many times were you hospitalized overnight because of your asthma? 0 0        Request from pharmacy:  Requested Prescriptions   Pending Prescriptions Disp Refills    budesonide-formoterol (SYMBICORT/BREYNA) 80-4.5 MCG/ACT Inhaler 10.2 g 5     Sig: Inhale 2 puffs into the lungs 2 times daily.       Inhaled Steroids Protocol Failed - 5/6/2025  1:46 PM        Failed - Asthma control assessment score within normal limits in last 6 months     Please review ACT score.           Passed - Patient is age 12 or older        Passed - Medication is active on med list and the sig matches. RN to manually verify dose and sig if red X/fail.     If the protocol passes (green check), you do not need to verify med dose and sig.    A prescription matches if they are the same clinical intention.    For Example: once daily and every morning are the same.    The protocol can not identify upper and lower case letters as matching and will fail.     For Example: Take 1 tablet (50 mg) by mouth daily     TAKE 1 TABLET (50 MG) BY MOUTH DAILY    For all fails (red x), verify dose and sig.    If the refill does match what is on file, the RN can still proceed to approve the refill request.       If they do not match,  route to the appropriate provider.             Passed - Recent (6 mo) or future (90 days) visit within the authorizing provider's specialty     The patient must have completed an in-person or virtual visit within the past 6 months or has a future visit scheduled within the next 90 days with the authorizing provider s specialty.  Urgent care and e-visits do not quality as an office visit for this protocol.          Passed - Medication indicated for associated diagnosis     Medication is associated with one or more of the following diagnoses:    Allergies   Asthma   COPD   Nasal Congestion   Nasal Polyps   Rhinitis   Cystic Fibrosis   Bronchiectasis

## 2025-05-06 NOTE — LETTER
5/6/2025      Jac Owens  [id number] [facility] [address] [place] [id number]      Dear Colleague,    Thank you for referring your patient, Jac Owens, to the [facility] [place]. Please see a copy of my visit note below.    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 This symptom since resolved. 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     He is presenting in follow-up today.    HISTORY OF PRESENT ILLNESS  - No vision complaints.    Last saw neuro-ophthalmology a couple years ago, but has not had any concerns with vision since then.   - No cognitive changes.   - No headaches or seizures.   - No back pain. No numbness, weakness.     No concerns with bladder/ bowel function.   - No fevers, chills, shortness of breath, or chest pain.   - Exercises regularly by walking his dog (Aussiedoodle). Lives at home by himself and is independent with ADLs. Planning to go to on a [place] cruise with his daughter this summer!      MEDICATIONS   Current Outpatient Medications   Medication Sig Dispense Refill     allopurinol (ZYLOPRIM) 300 MG tablet Take 1 tablet (300 mg) by mouth daily 90 tablet 3     calcium carbonate (TUMS) 500 MG chewable tablet Take 1 chew tab by mouth as needed for heartburn       cetirizine (ZYRTEC) 10 MG tablet Take 10 mg by mouth daily       cyclobenzaprine (FLEXERIL) 5 MG tablet Take 5 mg by mouth as needed for muscle  spasms       doxylamine (UNISOM) 12.5 mg TABS half-tab Take 12.5 mg by mouth at bedtime       famotidine (PEPCID) 40 MG tablet Take 1 tablet (40 mg) by mouth 2 times daily 180 tablet 3     fluticasone (FLONASE) 50 MCG/ACT nasal spray 2 sprays daily       guaiFENesin-codeine (ROBITUSSIN AC) 100-10 MG/5ML solution Take 5-10 mLs by mouth every 4 hours as needed for cough. 118 mL 0     ipratropium-albuterol (COMBIVENT RESPIMAT)  MCG/ACT inhaler Inhale 1 puff into the lungs as needed for shortness of breath, wheezing or cough       lisinopril (ZESTRIL) 20 MG tablet Take 1 tablet (20 mg) by mouth daily. 90 tablet 1     loratadine (CLARITIN) 10 MG tablet Take 10 mg by mouth daily.       metFORMIN (GLUCOPHAGE XR) 500 MG 24 hr tablet Take 1 tablet (500 mg) by mouth daily. 90 tablet 3     pantoprazole (PROTONIX) 40 MG EC tablet Take 1 tablet (40 mg) by mouth 2 times daily 180 tablet 3     simvastatin (ZOCOR) 40 MG tablet Take 1 tablet (40 mg) by mouth daily. 90 tablet 3     spacer (OPTICHAMBER ADEN) holding chamber For use with inhaler 1 each 0     triamcinolone (KENALOG) 0.1 % external ointment Apply topically as needed for irritation       budesonide-formoterol (SYMBICORT/BREYNA) 80-4.5 MCG/ACT Inhaler Inhale 2 puffs into the lungs 2 times daily. 10.2 g 5     DRUG ALLERGIES   Allergies   Allergen Reactions     Bee Venom Unknown and Anaphylaxis     Wasp Venom Protein Other (See Comments)     Cats      Seasonal Allergies        IMMUNIZATIONS   Immunization History   Administered Date(s) Administered     COVID-19 12+ (MODERNA) 09/09/2024     COVID-19 Monovalent 18+ (Moderna) 02/06/2021, 03/09/2021     Flu, Unspecified 10/01/2020     Q4a6-32 Novel Flu 01/05/2010     O4u1-13 Novel Flu P-free 01/05/2010     Hepatitis A (VAQTA)(ADULT 19+) 06/02/2004, 07/01/2004, 12/30/2004     Hepatitis B, Adult (Energix-B/Recombivax HB) 06/02/2004     Influenza (High Dose) Trivalent,PF (Fluzone) 09/20/2015, 11/20/2015, 11/21/2016,  09/16/2017, 09/26/2018, 10/10/2019, 09/09/2024, 11/15/2024     Influenza (IIV3) PF 11/24/2003, 01/05/2005, 11/10/2005, 11/01/2006     Influenza (prior to 2024) 11/14/2010     Influenza Vaccine 65+ (FLUAD) 09/10/2021, 10/02/2022     Influenza Vaccine 65+ (Fluzone HD) 11/10/2020     Influenza, Split Virus, Trivalent, Pf (Fluzone\Fluarix) 11/14/2010     Pneumo Conj 13-V (2010&after) 01/15/2016     Pneumococcal 23 valent 01/10/2001, 09/21/2012     RSV Vaccine (Arexvy) 11/29/2023     TDAP (Adacel,Boostrix) 09/21/2012     Td (Adult), Adsorbed 11/19/1997, 06/02/2004, 07/01/2004     Typhoid Oral 09/10/2013     Yellow Fever 10/18/2013     Zoster recombinant adjuvanted (Shingrix) 04/17/2018, 09/08/2018       ONCOLOGIC HISTORY  -History of GI stromal tumor (GIST)-  -3/15/2021 SURGERY: Gastrectomy for resection.   History of achalasia.    -Brain lesion-  -PRESENTATION: New onset diplopia.  -4/4/2021 MR brain imaging with an abnormal signal in left midbrain and cerebellar vermis with a punctate focus of enhancement.   -4/15/2021 CT of chest, abdomen and pelvis postsurgical changes of GEJ gastrointestinal stromal tumor (GIST) resection with no evidence of metastatic disease.  -4/16/2021 MR L-Spine with a single punctate enhancing focus likely along left outgoing sacral nerve root, which is nonspecific although may reflect a potential drop metastasis in the setting of enhancing and nonenhancing lesions of the brain, or other lesion. No abnormal enhancing lesion seen in the cervical or thoracic spine or cord.  -9/29/2021 MR brain imaging with an infiltrative appearing FLAIR hyperintense lesion within the cerebellar vermis without contrast enhancement is seen which may represent a low grade tumor. This is unchanged from the prior study. The previously noted hyperenhancing nodule along the left aspect of the cerebral aqueduct is again not visualized on this exam.  -9/29/2021 MRI L-spine with a punctate foci of sacral nerve root  "enhancement noted on MRI L-spine, likely schwannoma or ependymoma, is overall unchanged.  -5/2/2022 MR brain imaging was overall stable compared to prior imaging in 1/3/22, demonstrating stable FLAIR/T2 hyperintense vermis lesion. No new contrast enhancement noted on MRI Brain. Prior punctate foci of enhancement in left cerebral aqueduct noted on original April 2021 brain MRI has now resolved.  -5/2/2022 MR L-spine demonstrated overall unchanged punctate foci of sacral nerve root enhancement compared to prior L-spine MRI on 9/29/2021.  -11/2/2022 MR brain imaging with no significant interval change in the inferior medullary velum to cerebellar vermian lesion without associated enhancement, as compared to multiple prior studies dating back to 9/29/2021. This is likely reflective of low-grade neoplasm including subependymoma or other glial lesion.  -8/2/2023 MR brain imaging with static inferior vermian lesion measuring approximately 14 mm, unchanged mild atrophy.  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  -5/7/2024 NEURO-ONC/ MR brain & L-spine imaging: No new neurological concerns. All imaging stable. Recommending ongoing imaging surveillance.  -5/6/2025 NEURO-ONC/ MR brain & L-spine imaging: No new neurological concerns. All imaging stable.      PHYSICAL EXAMINATION  /75 (BP Location: Left arm, Patient Position: Sitting, Cuff Size: Adult Regular)   Pulse 65   Temp 97.3  F (36.3  C) (Oral)   Resp 18   Wt 68.4 kg (150 lb 14.4 oz)   SpO2 94%   BMI 22.94 kg/m    Wt Readings from Last 2 Encounters:   05/06/25 68.4 kg (150 lb 14.4 oz)   04/29/25 68.5 kg (151 lb)      Ht Readings from Last 2 Encounters:   04/29/25 1.727 m (5' 8\")   12/10/24 1.727 m (5' 7.99\")     KPS: 100    -Generally well appearing.  -Respiratory: Normal breath sounds, no audible wheezing.   -Psychiatric: " "Normal mood and affect. Pleasant, talkative.  -Neurologic:   MENTAL STATUS:     Alert, oriented.    Recall: Intact    Speech fluent.   Comprehension intact.     CRANIAL NERVES:     Symmetric facial movements.   Hearing intact.   No dysarthria.   GAIT:  Walks without assistance.      MEDICAL RECORDS  Personally reviewed; Primary care visit from 4/29, indicated for groin pain. The plan was for \"Physical Therapy  Referral. XR Hip Right 2-3 Views.\"    Hip x-ray with 1. No acute osseous abnormality. 2. No substantial degenerative change of either hip. 3. Degenerative changes of visualized lower lumbar spine.    LABS  Personally reviewed all available lab results; HbA1c 5.8, Lipid panel (LDL 63) from 3/20/2025.     IMAGING  Personally reviewed MR brain and L-spine imaging from today.     To my eye, the non-contrast enhancing lesion about the vermis is without an increase in perfusion or diffusion. The extent of the T2 FLAIR remains about ~15mmg.    Formal read; \"1.  Stable appearance overall from prior MRI examinations 11/2/2022, 8/2/2023 and 5/7/2024. Again noted is stable appearance to a nonspecific nonenhancing area of confluent FLAIR hyperintense nodular signal changes in the midline cerebellar vermis marginating and/or including the posterior aspect of the fourth ventricle. This is unchanged from prior study with some areas of nonenhancing hypointense T1 signal series 65798 image 88, series 105 image 13 and series 109 image 10. Small vessels traverse this region on postcontrast imaging without focal enhancement abnormality. Fourth ventricular caliber satisfactory. Stable diagnostic considerations from the prior examinations, stability favors an underlying benign process, such as a hamartoma, low-grade glial tumor or subependymoma. Patient does have history of gastrointestinal stromal tumor and therefore the possibility of stable metastatic disease in the posterior fossa is possible but considered unlikely " "given stability over time. No new lesions are evident. Continued surveillance MR imaging recommended at clinically appropriate interval to ensure ongoing stability. No corresponding perfusion abnormalities definitely identified in this region. 2.  No new or enlarging mass, mass effect or pathologic enhancement is otherwise present. Nothing for a marrow infiltrative process. 3.  No perfusion abnormality is evident elsewhere intracranially. 4.  No acute or chronic hemorrhage. 5.  No recent infarction.\"    To my eye, the contrast enhancing nodule along the L5 nerve root remains ~3-4mm.    Formal read; \"1.  Overall fairly stable examination from 5/7/2024 MR lumbar spine. 2.  Stable degenerative spondylosis throughout the lumbar spine with interspace narrowing and low-grade retrolisthesis at upper and mid lumbar levels. No progressive or high-grade subluxations are evident. Stable enhancing benign Schmorl's node endplate deformity about L3-L4 on previous. 3.  Stable nonspecific homogeneous enhancing nodule within the thecal sac dorsally at L5 to left of midline series 112 image 44 unchanged dating back to 8/2/2023. Favor stable benign nerve sheath tumor/schwannoma. There is reported history of GI stromal tumor and the possibility of stable intradural/leptomeningeal metastatic focus is considered less likely given the stability. 4.  No severe spinal stenosis or severe foraminal compromise at any lumbar level. Stable mild foraminal narrowing L3-L4. No disc herniations at any lumbar level.\"    Imaging was shown to and results were reviewed with Jac.       IMPRESSION  On date of service, 38 minutes was spent in clinic and 12 minutes was spent preparing for the visit through extensive chart review and coordinating care for this high complexity visit. The following is in explanation for the recommendations used to define the plan.       Fortunately, both MR brain and spine imaging from today has remained stable. " 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    With regard to his brain imaging; As previously discussed at [facility], the differential for the etiology of this lesion remains broad and includes lesions of low grade etiology like a glioneuronal turmor or a multinodular and vacuolating neuronal tumor (MVNT), but ultimately, definite diagnosis can only be made on review of the lesion's pathology. Given the asymptomatic nature of this finding plus imaging demonstrated no significant change since 2021 with no new concerning radiographic findings on the most recent scan, there is no intervention, diagnostic or therapeutic, indicated at this time. Therefore, it would be most advantageous to continue with serial MR brain imaging surveillance. If the risks/ benefit ratio changes with time as the result of new imaging findings and/ or progressive neurological/ clinical complaints, then a new plan can be devised.    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 This finding is also asymptomatic. Therefore, since imaging has remained stable for > 4 years, the plan is to repeat imaging per NCCN guidelines on an annual basis.     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      PROBLEM LIST  Brain lesion NOS  Abnormal brain imaging    PLAN  -CANCER-DIRECTED THERAPY-  -Continue imaging surveillance.   -Repeat MR brain in 12 months.     -SEIZURE MANAGEMENT-  -Given the lack of seizure history, there is no indication to prescribe an antiepileptic at this time.     Return to clinic in 5/2026 + imaging.      The longitudinal plan of care for the diagnosis(es)/condition(s) as documented were addressed during this visit. Due to the added complexity in care, I will continue to support Jac in the subsequent management and with ongoing continuity of care.     Patient  "was also seen and examined by Dr. Alonso Whipple, hematology/ oncology fellow under my direct supervision.     Jocelyn Cannon MD  Neuro-oncology       Oncology Rooming Note    May 6, 2025 10:42 AM   Jac Owens is a 79 year old male who presents for:    Chief Complaint   Patient presents with     Oncology Clinic Visit     Hip pain, right  Abnormal MRI, lumbar spine          Initial Vitals: /75 (BP Location: Left arm, Patient Position: Sitting, Cuff Size: Adult Regular)   Pulse 65   Temp 97.3  F (36.3  C) (Oral)   Resp 18   Wt 68.4 kg (150 lb 14.4 oz)   SpO2 94%   BMI 22.94 kg/m   Estimated body mass index is 22.94 kg/m  as calculated from the following:    Height as of 4/29/25: 1.727 m (5' 8\").    Weight as of this encounter: 68.4 kg (150 lb 14.4 oz). Body surface area is 1.81 meters squared.  No Pain (0) Comment: Data Unavailable   No LMP for male patient.  Allergies reviewed: Yes  Medications reviewed: Yes    Medications: Medication refills not needed today.  Pharmacy name entered into [employer]: [id number] [place] [id number]    Frailty Screening:   Is the patient here for a new oncology consult visit in cancer care? 2. No    PHQ9:  Did this patient require a PHQ9?: No      Clinical concerns: no        Kimberly Zhang CMA                Again, thank you for allowing me to participate in the care of your patient.        Sincerely,        Jocelyn Cannon MD    Electronically signed"

## 2025-05-06 NOTE — PROGRESS NOTES
"Oncology Rooming Note    May 6, 2025 10:42 AM   Jac Owens is a 79 year old male who presents for:    Chief Complaint   Patient presents with    Oncology Clinic Visit     Hip pain, right  Abnormal MRI, lumbar spine          Initial Vitals: /75 (BP Location: Left arm, Patient Position: Sitting, Cuff Size: Adult Regular)   Pulse 65   Temp 97.3  F (36.3  C) (Oral)   Resp 18   Wt 68.4 kg (150 lb 14.4 oz)   SpO2 94%   BMI 22.94 kg/m   Estimated body mass index is 22.94 kg/m  as calculated from the following:    Height as of 4/29/25: 1.727 m (5' 8\").    Weight as of this encounter: 68.4 kg (150 lb 14.4 oz). Body surface area is 1.81 meters squared.  No Pain (0) Comment: Data Unavailable   No LMP for male patient.  Allergies reviewed: Yes  Medications reviewed: Yes    Medications: Medication refills not needed today.  Pharmacy name entered into [employer]: [id number] [id number] [place] [phone]    Frailty Screening:   Is the patient here for a new oncology consult visit in cancer care? 2. No    PHQ9:  Did this patient require a PHQ9?: No      Clinical concerns: no        Kimberly Zhang CMA              "

## 2025-05-08 ENCOUNTER — THERAPY VISIT (OUTPATIENT)
Dept: PHYSICAL THERAPY | Facility: CLINIC | Age: 80
End: 2025-05-08
Payer: MEDICARE

## 2025-05-08 DIAGNOSIS — M25.551 HIP PAIN, RIGHT: Primary | ICD-10-CM

## 2025-05-15 ENCOUNTER — THERAPY VISIT (OUTPATIENT)
Dept: PHYSICAL THERAPY | Facility: CLINIC | Age: 80
End: 2025-05-15
Payer: MEDICARE

## 2025-05-15 DIAGNOSIS — M25.551 HIP PAIN, RIGHT: Primary | ICD-10-CM

## 2025-05-22 ENCOUNTER — MYC REFILL (OUTPATIENT)
Dept: INTERNAL MEDICINE | Facility: CLINIC | Age: 80
End: 2025-05-22

## 2025-05-22 ENCOUNTER — THERAPY VISIT (OUTPATIENT)
Dept: PHYSICAL THERAPY | Facility: CLINIC | Age: 80
End: 2025-05-22
Payer: MEDICARE

## 2025-05-22 DIAGNOSIS — J30.2 SEASONAL ALLERGIES: Primary | ICD-10-CM

## 2025-05-22 DIAGNOSIS — M25.551 HIP PAIN, RIGHT: Primary | ICD-10-CM

## 2025-05-28 RX ORDER — FLUTICASONE PROPIONATE 50 MCG
2 SPRAY, SUSPENSION (ML) NASAL DAILY
Qty: 18 ML | Refills: 3 | Status: SHIPPED | OUTPATIENT
Start: 2025-05-28

## 2025-05-29 ENCOUNTER — THERAPY VISIT (OUTPATIENT)
Dept: PHYSICAL THERAPY | Facility: CLINIC | Age: 80
End: 2025-05-29
Payer: MEDICARE

## 2025-05-29 DIAGNOSIS — M25.551 HIP PAIN, RIGHT: Primary | ICD-10-CM

## 2025-06-10 DIAGNOSIS — M10.9 GOUT, UNSPECIFIED CAUSE, UNSPECIFIED CHRONICITY, UNSPECIFIED SITE: ICD-10-CM

## 2025-06-11 RX ORDER — ALLOPURINOL 300 MG/1
1 TABLET ORAL
Qty: 90 TABLET | Refills: 2 | Status: SHIPPED | OUTPATIENT
Start: 2025-06-11

## 2025-06-11 NOTE — TELEPHONE ENCOUNTER
Last Written Prescription:  allopurinol (ZYLOPRIM) 300 MG tablet 90 tablet 3 7/5/2024   Sig - Route: Take 1 tablet (300 mg) by mouth daily - Oral   ----------------------  Last Visit Date: 4/29/25  Future Visit Date: 7/18/25  ----------------------    Refill decision: Medication refilled per  Medication Refill in Ambulatory Care  policy.      Request from pharmacy:  Requested Prescriptions   Pending Prescriptions Disp Refills    allopurinol (ZYLOPRIM) 300 MG tablet [Pharmacy Med Name: ALLOPURINOL 300 MG TABLET] 27 tablet 3     Sig: TAKE 1 TABLET BY MOUTH EVERY DAY       Gout Agents Protocol Failed - 6/11/2025 10:38 AM        Failed - Medication is active on med list and the sig matches. RN to manually verify dose and sig if red X/fail.     If the protocol passes (green check), you do not need to verify med dose and sig.    A prescription matches if they are the same clinical intention.    For Example: once daily and every morning are the same.    The protocol can not identify upper and lower case letters as matching and will fail.     For Example: Take 1 tablet (50 mg) by mouth daily     TAKE 1 TABLET (50 MG) BY MOUTH DAILY    For all fails (red x), verify dose and sig.    If the refill does match what is on file, the RN can still proceed to approve the refill request.       If they do not match, route to the appropriate provider.             Passed - CBC on file in past 12 months     Recent Labs   Lab Test 12/06/24  0956   WBC 9.6   RBC 4.95   HGB 15.9   HCT 47.6                    Passed - ALT on file in past 12 months     Recent Labs   Lab Test 12/06/24  0956   ALT 16             Passed - Has Uric Acid on file in past 12 months and value is less than 6     Recent Labs   Lab Test 12/06/24  0956   URIC 3.1*     If level is 6mg/dL or greater, ok to refill one time and refer to provider.           Passed - Medication indicated for associated diagnosis     One of the following medications: colchicine is  prescribed for one or more of the following conditions:     Amyloidosis   ulcer of mouth   Bechet's syndrome   Pericarditis   Peyronie's disease, psoriasis          Passed - Has GFR on file in past 12 months and most recent value is normal        Passed - Recent (12 month) or future (90 days) visit with authorizing provider's specialty (provided they have been seen in the past 15 months)     The patient must have completed an in-person or virtual visit within the past 12 months or has a future visit scheduled within the next 90 days with the authorizing provider s specialty.  Urgent care and e-visits do not qualify as an office visit for this protocol.          Passed - Patient is age 18 or older     Refill protocol is for patient's aged 18 years and older

## 2025-06-12 ENCOUNTER — THERAPY VISIT (OUTPATIENT)
Dept: PHYSICAL THERAPY | Facility: CLINIC | Age: 80
End: 2025-06-12
Payer: MEDICARE

## 2025-06-12 DIAGNOSIS — M25.551 HIP PAIN, RIGHT: Primary | ICD-10-CM

## 2025-06-12 ASSESSMENT — ACTIVITIES OF DAILY LIVING (ADL)
WALKING_DOWN_STEEP_HILLS: NO DIFFICULTY AT ALL
WALKING_15_MINUTES_OR_GREATER: SLIGHT DIFFICULTY
HOS_ADL_SCORE(%): 92.65
PUTTING_ON_SOCKS_AND_SHOES: NO DIFFICULTY AT ALL
ROLLING_OVER_IN_BED: NO DIFFICULTY AT ALL
GETTING_INTO_AND_OUT_OF_AN_AVERAGE_CAR: NO DIFFICULTY AT ALL
WALKING_UP_STEEP_HILLS: SLIGHT DIFFICULTY
HEAVY_WORK: NO DIFFICULTY AT ALL
SITTING_FOR_15_MINUTES: NO DIFFICULTY AT ALL
STANDING_FOR_15_MINUTES: SLIGHT DIFFICULTY
HOS_ADL_COUNT: 17
LIGHT_TO_MODERATE_WORK: NO DIFFICULTY AT ALL
GOING_DOWN_1_FLIGHT_OF_STAIRS: NO DIFFICULTY AT ALL
HOS_ADL_HIGHEST_POTENTIAL_SCORE: 68
RECREATIONAL_ACTIVITIES: SLIGHT DIFFICULTY
STEPPING_UP_AND_DOWN_CURBS: NO DIFFICULTY AT ALL
HOS_ADL_ITEM_SCORE_TOTAL: 63
GETTING_INTO_AND_OUT_OF_A_BATHTUB: NO DIFFICULTY AT ALL
WALKING_INITIALLY: NO DIFFICULTY AT ALL
DEEP_SQUATTING: NO DIFFICULTY AT ALL
TWISTING/PIVOTING_ON_INVOLVED_LEG: NO DIFFICULTY AT ALL
GOING_UP_1_FLIGHT_OF_STAIRS: NO DIFFICULTY AT ALL
WALKING_APPROXIMATELY_10_MINUTES: SLIGHT DIFFICULTY

## 2025-06-12 NOTE — PROGRESS NOTES
DISCHARGE  Reason for Discharge: Patient has met all goals.    Equipment Issued:     Discharge Plan: Patient to continue home program.   06/12/25 0500   Appointment Info   Signing clinician's name / credentials Lele Coleman DPT   Total/Authorized Visits 6 ( e and t)   Visits Used 6   Medical Diagnosis hip pain, right   PT Tx Diagnosis groin pain on R   Progress Note/Certification   Start of Care Date 05/02/25   Onset of illness/injury or Date of Surgery 02/02/25   Therapy Frequency 1x/wk for 4 weeks then every other week for 8 weeks   Predicted Duration 12 weeks   Certification date from 05/02/25   Certification date to 07/25/25   PT Goal 1   Goal Identifier walking   Goal Description improve pain and walking tolerance to walk 1+mile without groin pain   Rationale to maximize safety and independence with performance of ADLs and functional tasks;to maximize safety and independence within the community;to maximize safety and independence with transportation   Goal Progress 2 miles without pain   Target Date 07/25/25   Date Met 06/12/25   Subjective Report   Subjective Report Hasn't really had the pain in the last two weeks. Hasn't done quite as much walking in that time. Feels ready to self manage. Overall 100%.   Objective Measures   Objective Measures Objective Measure 3   Objective Measure 1   Objective Measure lumbar AROM   Details WNL   Objective Measure 2   Objective Measure R hip PROM   Details WNL   Objective Measure 3   Objective Measure strength   Details B hip abduction 5-/5   Treatment Interventions (PT)   Interventions Therapeutic Procedure/Exercise;Therapeutic Activity;Neuromuscular Re-education;Manual Therapy   Therapeutic Procedure/Exercise   Therapeutic Procedures: strength, endurance, ROM, flexibility minutes (25078) 25   PTRx Ther Proc 1 Repeated Hip Extension in Neutral/IR/ER   PTRx Ther Proc 1 - Details x10 after knee bends   PTRx Ther Proc 2 Clamshell Feet Together   PTRx Ther Proc 2 -  Details GTB B x 30   PTRx Ther Proc 3 Bridging #1   PTRx Ther Proc 3 - Details GTB x 30   PTRx Ther Proc 4 Hip Abduction Straight Leg Raise   PTRx Ther Proc 4 - Details 1 lb x 26 on R too difficult on L   PTRx Ther Proc 5 Hip AROM Standing Abduction   PTRx Ther Proc 5 - Details L x 15 (pt wanted to start working both sides sidelying too difficult on L)   PTRx Ther Proc 6 Knee Bends   PTRx Ther Proc 6 - Details x20 with cues to avoid excessive trunk flexion pt had forgot to perform   Patient Response/Progress see above   Plan   Home program see ptrx   Updates to plan of care GTB, standing hip abduction for L as desired   Plan for next session d/c to [facility] as of 6/12/25   Total Session Time   Timed Code Treatment Minutes 25   Total Treatment Time (sum of timed and untimed services) 25         Referring Provider:  Francisco Hammer

## 2025-07-06 ENCOUNTER — HEALTH MAINTENANCE LETTER (OUTPATIENT)
Age: 80
End: 2025-07-06

## 2025-07-18 ENCOUNTER — OFFICE VISIT (OUTPATIENT)
Dept: INTERNAL MEDICINE | Facility: CLINIC | Age: 80
End: 2025-07-18
Payer: MEDICARE

## 2025-07-18 VITALS
HEART RATE: 53 BPM | WEIGHT: 149.3 LBS | HEIGHT: 68 IN | RESPIRATION RATE: 16 BRPM | BODY MASS INDEX: 22.63 KG/M2 | OXYGEN SATURATION: 96 % | DIASTOLIC BLOOD PRESSURE: 80 MMHG | SYSTOLIC BLOOD PRESSURE: 126 MMHG

## 2025-07-18 DIAGNOSIS — E11.9 TYPE 2 DIABETES MELLITUS WITHOUT COMPLICATION, WITHOUT LONG-TERM CURRENT USE OF INSULIN (H): Primary | ICD-10-CM

## 2025-07-18 DIAGNOSIS — Z23 NEED FOR VACCINATION: ICD-10-CM

## 2025-07-18 DIAGNOSIS — R10.31 RIGHT INGUINAL PAIN: ICD-10-CM

## 2025-07-18 PROCEDURE — 3079F DIAST BP 80-89 MM HG: CPT | Performed by: INTERNAL MEDICINE

## 2025-07-18 PROCEDURE — 3074F SYST BP LT 130 MM HG: CPT | Performed by: INTERNAL MEDICINE

## 2025-07-18 PROCEDURE — 99214 OFFICE O/P EST MOD 30 MIN: CPT | Performed by: INTERNAL MEDICINE

## 2025-07-18 PROCEDURE — 3044F HG A1C LEVEL LT 7.0%: CPT | Performed by: INTERNAL MEDICINE

## 2025-07-18 PROCEDURE — 83036 HEMOGLOBIN GLYCOSYLATED A1C: CPT | Performed by: INTERNAL MEDICINE

## 2025-07-18 PROCEDURE — 99000 SPECIMEN HANDLING OFFICE-LAB: CPT | Performed by: PATHOLOGY

## 2025-07-18 NOTE — PROGRESS NOTES
Assessment & Plan   Right inguinal pain  Concern for some form of testicular etiology such as varicocele, spermatic cord obstruction versus occult hernia. Recommended an ultrasound to assess for both, with possibility for CT if US unremarkable.   - US Testicular & Scrotum w Doppler Ltd    Type 2 diabetes mellitus without complication, without long-term current use of insulin (H)  Due for HgbA1c check, lab order placed  - HEMOGLOBIN A1C    Need for vaccination  Due for TDAP, recommend to obtain at local pharmacy    Elevated PSA  Conversation held with patient regarding PSA testing in light of MRI from March 2024 and absence of any symptoms. Earlier biopsy also revealed BPH. Discussed with patient that decision whether or not to continue testing is up to them, but at this time, changes all appear to be age-related. Will hold off screening for now; able to restart PSA level checks if Reza desires.     Right ear lesion on tragus  Patient had recent cautery work performed on ear, per patient. Appears to be a slower healing wound versus growth/dysplasia. Expect it will heal slowly   - Monitor and return for assessment if worsening or fails to heal    Senile Purpura  Normal skin changes for age, with thinning of the dermis and more vascular fragility; reassurance provided.     Mary Wright MD        Nitish Nathan is a 79 year old, presenting for the following health issues:  follow up DM, elevated PSA, groin pain  Follow Up (Groin pain, PSA results, lesion on ear, bruising on skin)      7/18/2025    10:30 AM   Additional Questions   Roomed by SK EMT     Via the Health Maintenance questionnaire, the patient has reported the following services have been completed -Eye Exam: [facility] 2025-10-24, this information has been sent to the abstraction team.  Right Groin Pain:  Pain appears to be anteromedial in located in the groing. Pain is usually associated with walking but can also occur with  "sitting and standing, there does not appear to be a position that makes it worse. Pain is described as a sudden onset of a dull pain located in the groin. Pain is lessened/not present when taking Advil prior to walking. The pain is not getting any better or worse since his last visit in April. Other exercises include a rowing machine and an elliptical. Strength and both active and passive ROM symmetric with no deficiencies or pain.  Of note, he had a plain film of that hip previously that did not show any significant osteoarthritis, making other potential causes more likely.    Elevated PSA:  Patient expressed interest in recommendations regarding what to do with PSA levels in light of Jet's diagnosis. MRI performed in March of 2024 showed PIRADS score of 2 which is low risk. Dr. Owens would like to know whether to continue to monitor his PSA at this time.    Diabetes Mellitus II, without complication, without long term use of insulin  Patient is due for an A1c check     Right Ear Lesion:  Small area of redness with some eschar present at site of recent cautery work per Derm on tragus. He would like it evaluated as it is hard to see himself. Feels keeping it wet with Vaseline is helpful.    Senile Purpura:  Appears to occur with minimal trauma and he notices visually more bruising. Would like assessment to determine if this is concerning.     History of Present Illness       Reason for visit:  Re check groin pain  Symptom onset:  More than a month  Symptoms include:  Groin pain intermittent  Symptom intensity:  Moderate  Symptom progression:  Staying the same  Had these symptoms before:  Yes  Has tried/received treatment for these symptoms:  Yes  Previous treatment was successful:  No  What makes it worse:  Long walks   He is taking medications regularly.          Objective    /80 (BP Location: Right arm, Patient Position: Sitting, Cuff Size: Adult Regular)   Pulse 53   Resp 16   Ht 1.727 m (5' 8\")   " Wt 67.7 kg (149 lb 4.8 oz)   SpO2 96%   BMI 22.70 kg/m    Body mass index is 22.7 kg/m .  Physical Exam   GENERAL: alert and no distress  RESP: lungs clear to auscultation - no rales, rhonchi or wheezes  CV: regular rate and rhythm, normal S1 S2, no S3 or S4, no murmur, click or rub, no peripheral edema  ABDOMEN: soft, nontender, no hepatosplenomegaly, no masses and bowel sounds normal  MS: no gross musculoskeletal defects noted, no edema  Skin: well healing small lesion near the site of cautery external ear.  Scattered ecchymoses notes over bilateral upper extremities without any palpable rash or associated skin changes.    Mary Wright MD        Thank you for involving me in the care of Dr. Owens,   Giancarlo Stapleton, MS3 on 7/18/2025 at 12:31 PM     Signed Electronically by: Mary Wright MD

## 2025-07-18 NOTE — PROGRESS NOTES
"  {PROVIDER CHARTING PREFERENCE:165294}    Subjective   Jac is a 79 year old, presenting for the following health issues:  Follow Up (Groin pain, PSA results, lesion on ear, bruising on skin)      7/18/2025    10:30 AM   Additional Questions   Roomed by SK EMT     Via the Health Maintenance questionnaire, the patient has reported the following services have been completed -[facility]25[facility]24, this information has been sent to the abstraction team.  History of Present Illness       Reason for visit:  Re check groin pain  Symptom onset:  More than a month  Symptoms include:  Groin pain intermittent  Symptom intensity:  Moderate  Symptom progression:  Staying the same  Had these symptoms before:  Yes  Has tried/received treatment for these symptoms:  Yes  Previous treatment was successful:  No  What makes it worse:  Long walks   He is taking medications regularly.        {MA/LPN/RN Pre-Provider Visit Orders- hCG/UA/Strep (Optional):720176}  {SUPERLIST (Optional):171020}  {additonal problems for provider to add (Optional):691001}    {ROS Picklists (Optional):676253}      Objective    /80 (BP Location: Right arm, Patient Position: Sitting, Cuff Size: Adult Regular)   Pulse 53   Resp 16   Ht 1.727 m (5' 8\")   Wt 67.7 kg (149 lb 4.8 oz)   SpO2 96%   BMI 22.70 kg/m    Body mass index is 22.7 kg/m .  Physical Exam   {Exam List (Optional):476299}    {Diagnostic Test Results (Optional):526181}        Signed Electronically by: Mary Wright MD  {Email feedback regarding this note to [email or web address]   :102217}  "

## 2025-07-21 DIAGNOSIS — K21.00 GASTROESOPHAGEAL REFLUX DISEASE WITH ESOPHAGITIS, UNSPECIFIED WHETHER HEMORRHAGE: ICD-10-CM

## 2025-07-22 ENCOUNTER — ANCILLARY PROCEDURE (OUTPATIENT)
Dept: ULTRASOUND IMAGING | Facility: CLINIC | Age: 80
End: 2025-07-22
Attending: INTERNAL MEDICINE
Payer: MEDICARE

## 2025-07-22 DIAGNOSIS — R10.31 RIGHT INGUINAL PAIN: ICD-10-CM

## 2025-07-22 PROCEDURE — 76870 US EXAM SCROTUM: CPT | Performed by: RADIOLOGY

## 2025-07-23 RX ORDER — PANTOPRAZOLE SODIUM 40 MG/1
40 TABLET, DELAYED RELEASE ORAL DAILY
Qty: 90 TABLET | Refills: 1 | Status: SHIPPED | OUTPATIENT
Start: 2025-07-23

## 2025-07-23 RX ORDER — PANTOPRAZOLE SODIUM 40 MG/1
40 TABLET, DELAYED RELEASE ORAL
Qty: 180 TABLET | Refills: 1 | Status: SHIPPED | OUTPATIENT
Start: 2025-07-23

## 2025-07-24 NOTE — TELEPHONE ENCOUNTER
Last Written Prescription:  7/5/24  180 : 3  ----------------------  Last Visit Date: 7/18/25  Future Visit Date: 0  ----------------------  Refill decision:   [x] Medication refilled per  Medication Refill in Ambulatory Care  policy.  Medication is active on med list and the sig matches. RN REVIEW    Request from pharmacy:  Requested Prescriptions   Pending Prescriptions Disp Refills    pantoprazole (PROTONIX) 40 MG EC tablet [Pharmacy Med Name: PANTOPRAZOLE SOD DR 40 MG TAB] 54 tablet 3     Sig: TAKE 1 TABLET BY MOUTH 2 TIMES DAILY       PPI Protocol Failed - 7/23/2025 11:51 PM        Failed - Medication is active on med list and the sig matches. RN to manually verify dose and sig if red X/fail.     If the protocol passes (green check), you do not need to verify med dose and sig.    A prescription matches if they are the same clinical intention.    For Example: once daily and every morning are the same.    The protocol can not identify upper and lower case letters as matching and will fail.     For Example: Take 1 tablet (50 mg) by mouth daily     TAKE 1 TABLET (50 MG) BY MOUTH DAILY    For all fails (red x), verify dose and sig.    If the refill does match what is on file, the RN can still proceed to approve the refill request.       If they do not match, route to the appropriate provider.             Passed - Medication indicated for associated diagnosis     The medication is prescribed for one or more of the following conditions:     Erosive esophagitis    Gastritis   Gastric hypersecretion   Gastric ulcer   Gastroesophageal reflux disease   Helicobacter pylori gastrointestinal tract infection   Ulcer of duodenum   Drug-induced peptic ulcer   Esophageal stricture   Gastrointestinal hemorrhage   Indigestion   Stress ulcer   Zollinger-Danielson syndrome   Weir s esophagus   Laryngopharyngeal reflux   Epigastric Pain   Morbid Obesity   Cough   History of Peptic Ulcer   Esophageal Atresia, Stenosis and  Fistula   Cystic Fibrosis  Bronchiectasis          Passed - Recent (12 month) or future (90 days) visit with authorizing provider's specialty (provided they have been seen in the past 15 months)     The patient must have completed an in-person or virtual visit within the past 12 months or has a future visit scheduled within the next 90 days with the authorizing provider s specialty.  Urgent care and e-visits do not qualify as an office visit for this protocol.          Passed - Patient is age 18 or older